# Patient Record
Sex: FEMALE | Race: WHITE
[De-identification: names, ages, dates, MRNs, and addresses within clinical notes are randomized per-mention and may not be internally consistent; named-entity substitution may affect disease eponyms.]

---

## 2019-02-12 ENCOUNTER — HOSPITAL ENCOUNTER (INPATIENT)
Dept: HOSPITAL 11 - JP.ED | Age: 84
LOS: 1 days | Discharge: HOME | DRG: 195 | End: 2019-02-13
Attending: FAMILY MEDICINE | Admitting: FAMILY MEDICINE
Payer: MEDICARE

## 2019-02-12 DIAGNOSIS — Z91.040: ICD-10-CM

## 2019-02-12 DIAGNOSIS — Z88.8: ICD-10-CM

## 2019-02-12 DIAGNOSIS — Z90.49: ICD-10-CM

## 2019-02-12 DIAGNOSIS — J40: ICD-10-CM

## 2019-02-12 DIAGNOSIS — Z91.048: ICD-10-CM

## 2019-02-12 DIAGNOSIS — R53.83: ICD-10-CM

## 2019-02-12 DIAGNOSIS — R41.0: ICD-10-CM

## 2019-02-12 DIAGNOSIS — I10: ICD-10-CM

## 2019-02-12 DIAGNOSIS — H54.7: ICD-10-CM

## 2019-02-12 DIAGNOSIS — Z85.820: ICD-10-CM

## 2019-02-12 DIAGNOSIS — J10.1: Primary | ICD-10-CM

## 2019-02-12 DIAGNOSIS — M16.12: ICD-10-CM

## 2019-02-12 DIAGNOSIS — R53.1: ICD-10-CM

## 2019-02-12 PROCEDURE — 87804 INFLUENZA ASSAY W/OPTIC: CPT

## 2019-02-12 PROCEDURE — 36415 COLL VENOUS BLD VENIPUNCTURE: CPT

## 2019-02-12 PROCEDURE — 80053 COMPREHEN METABOLIC PANEL: CPT

## 2019-02-12 PROCEDURE — 71045 X-RAY EXAM CHEST 1 VIEW: CPT

## 2019-02-12 PROCEDURE — 83605 ASSAY OF LACTIC ACID: CPT

## 2019-02-12 PROCEDURE — 85025 COMPLETE CBC W/AUTO DIFF WBC: CPT

## 2019-02-12 PROCEDURE — 81001 URINALYSIS AUTO W/SCOPE: CPT

## 2019-02-12 PROCEDURE — 96365 THER/PROPH/DIAG IV INF INIT: CPT

## 2019-02-12 PROCEDURE — 99285 EMERGENCY DEPT VISIT HI MDM: CPT

## 2019-02-12 PROCEDURE — 86140 C-REACTIVE PROTEIN: CPT

## 2019-02-12 PROCEDURE — 87040 BLOOD CULTURE FOR BACTERIA: CPT

## 2019-02-12 NOTE — HP
IDENTIFYING DATA:  Leny Conley is an 88-year-old   female from [facility].

 

CHIEF COMPLAINT:  "I feel weak."

 

HISTORY OF PRESENT ILLNESS:  An elderly female has an approximately a week long history of

an acute respiratory infection, initially thought to represent a simple viral syndrome.  She

was seen in the clinic and found to have negative influenza testing and was instructed to

treat symptomatically.  Over the last 48 hours, she has had increasing weakness,

questionable fever, malaise, and periods of confusion.  She returned to the emergency room

this evening with general lethargy and inability to care for self and was found to have

positive influenza A nasal smear.  She has had fevers and chills as well as a mildly

pleuritic cough with yellow-green sputum production.  Appetite is diminished.  No abdominal

pain, nausea, or emesis.  No nasal congestion, sore throat, or headaches.  Mild chronic

arthralgias are reported by patient.  She has not received an annual influenza vaccine.

 

PAST MEDICAL HISTORY:  Previous surgeries include cholecystectomy and screening colonoscopy

as well as  delivery in the remote past.  She has had bilateral cataract

extractions.

 

Chronic health problems include only essential hypertension with pharmacologic therapy and

degenerative arthritis of the left hip requiring ambulation with use of a walker.  She is

not currently driving.  Family checks near daily on her status, do provide assistance with

transportation, appointments, and grocery shopping.

 

ALLERGIES:  REPORTED TO ADHESIVE TAPE, DIPHENHYDRAMINE, IBUPROFEN, AND LASIX.

 

 

CURRENT MEDICATIONS:  Acetaminophen 1000 mg q.a.m. and q.6 hours p.r.n., hydrochlorothiazide

with triamterene 25/37.5 mg 1/2 tablet daily, lisinopril 1 tablet b.i.d. dose unknown, and

vitamin B12 1000 mcg p.o. daily.

 

HABITS:  Nonsmoker.  No alcohol use.  Caffeine intake of 3-4 cups of coffee daily.

 

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  She is now retired, living alone [place].  Family checks on her frequently.  A home care aide checks daily and

provides assistance with housekeeping as needed.

 

FAMILY HISTORY:  A great granddaughter has recent upper respiratory infection.  No other

familial history is of current illnesses.

 

REVIEW OF SYSTEMS:  NEUROLOGIC:  History of macular degeneration and cataract disease with

visual impairment.  No longer able to read without use of magnification.  She does not

drive.  No history of stroke, seizures, dementia, or focal weakness.

CARDIAC:  Hypertension.  No history of diabetes, ischemic heart disease, MI, chest pain,

palpitations, syncope, or unusual shortness of breath.  No dependent edema or peripheral

vascular disease.

RESPIRATORY:  Denies asthma, emphysema, tuberculosis, chronic cough or sputum production.

GI:  No history of chronic dyspepsia, hepatitis, jaundice, melena or hematochezia.

:  No chronic renal disease.  Mild urinary urgency noted today.  With her general weakness

and inability to ambulate independently, she was incontinent today.

MUSCULOSKELETAL:  Chronic left hip arthritic pain.  Some stiffness at the knees reported by

patient.  She does have a history of falls and has medical alert bracelet worn routinely.

 

PHYSICAL EXAMINATION:

GENERAL:  Appearance is that of a weak and elderly female with a harsh mildly rhonchorous

cough.

VITAL SIGNS:  Initial vitals; temperature 98.9 degrees Fahrenheit, pulse rate 85,

respiratory rate 16 with O2 sats of 96% on room air, blood pressure 101/54.

HEENT:  Status post cataract extraction.  Sclerae anicteric.  No facial asymmetry.  Speech

is clear.  Hearing slightly diminished.  Oral mucosa is moist.

NECK:  Brisk carotid pulses.  No stridor or adenopathy.

LUNGS:  Resonant, nontachypneic, symmetrical aeration.  Mild coarse expiratory rhonchi.  No

rales or wheezes heard.

HEART:  Regular without murmurs or gallops.

ABDOMEN:  Soft, nontender, and nondistended.  No organomegaly.  Active sounds.  Good femoral

pulses.  No abdominal bruits or CVA pain.

 AND RECTAL:  Omitted.

EXTREMITIES:  No pitting edema.

SKIN:  Warm and pink, intact.  Good distal arterial pulses.  Brisk capillary refill.

 

LABORATORY DATA:  On admission, WBC 3.8 with 81% segs, 9% lymphocytes, 10% monos, hemoglobin

11.2, hematocrit 35.2, platelet count 186,000.  Sodium 137, potassium 3.6, BUN 21,

creatinine 1.1, GFR 47, glucose 116.  Alkaline phosphatase 76 with AST of 21.  C-reactive

protein moderately elevated at 4.77.  Urinalysis; specific gravity 1.015, positive

ketonuria, moderate occult blood and bilirubin, negative leukocyte esterase, 0-5 wbc's, few

bacteria.  Lactic acid within normal range at 1.2.  Chest x-ray; no acute areas of

consolidation or infiltrates to suggest acute bacterial pulmonary sepsis.  Influenza nasal

smear is positive for influenza A.

 

IMPRESSIONS:

1. General weakness, fever, and lethargy secondary to influenza A.

2. History of hypertension.

3. Degenerative arthritis of left hip with moderate mobility limitations.

4. Currently at an acute vulnerable status with her infectious illness and inability to

    care for self.

 

PLAN:  The patient will be admitted to observation bed.  We will provide antipyretics,

antivirals in the form of scheduled Tamiflu, Tylenol for fever and discomfort as well as

ongoing use of antihypertensives.  Allow ambulation with assistance as tolerated and a

regular diet.  If general weakness does not allow for return to her private residence within

24 to 48 hours, we will need to consider transition to nursing home for ongoing supportive

care.  Family is in agreement.

 

 

 

 

Saleem Weathers MD

DD:  2019 22:26:39

DT:  2019 23:24:31

Job #:  [id number]

## 2019-02-12 NOTE — EDM.PDOC
<OfficerJoshua - Last Filed: 19 18:03>





ED HPI GENERAL MEDICAL PROBLEM





- General


Chief Complaint: Genitourinary Problem


Stated Complaint: ILLNESS


Time Seen by Provider: 19 17:48


Source of Information: Reports: Patient, Family, RN Notes Reviewed


History Limitations: Reports: No Limitations





- History of Present Illness


INITIAL COMMENTS - FREE TEXT/NARRATIVE: 





88-year-old female presents to emergency department today via EMS services for 

new onset confusion and weakness, she states she was seen in the clinic about a 

week ago for a cough that she's had now for 2 weeks evaluation at that time 

included rapid strep screen and rapid influenza screen both were negative. She 

has not done any treatment. She states over the last couple days she's 

developed some urinary frequency and dysuria as well as a fever





- Related Data


 Allergies











Allergy/AdvReac Type Severity Reaction Status Date / Time


 


adhesive tape Allergy  Blisters Verified 19 17:44


 


diphenhydramine HCl Allergy  Blisters Verified 19 17:44





[From Benadryl]     


 


ibuprofen [From Motrin] Allergy  Cannot Verified 19 17:44





   Remember  


 


latex Allergy  Blisters Verified 19 17:44











Home Meds: 


 Home Meds





Acetaminophen [Tylenol Extra Strength] 1,000 mg PO Q6HR 10/09/15 [History]


HCTZ/Triamterene [Maxzide 25-37.5 MG] 0.5 tab PO DAILY 10/09/15 [History]


Lisinopril 1 tab PO BID 11/21/15 [History]


Cyanocobalamin (Vitamin B-12) [Vitamin B-12] 1,000 mcg PO DAILY 19 [

History]











Past Medical History


HEENT History: Reports: Impaired Vision, Other (See Below)


Other HEENT History: Past hx of bloody nose.


Cardiovascular History: Reports: Hypertension


OB/GYN History: Reports: Pregnancy


Other OB/GYN History: Dermoid Pregnancy


Psychiatric History: Reports: Anxiety


Oncologic (Cancer) History: Reports: Other (See Below)


Other Oncologic History: melanoma





- Infectious Disease History


Infectious Disease History: Reports: Chicken Pox, Measles, Mumps, Shingles





- Past Surgical History


HEENT Surgical History: Reports: Cataract Surgery


GI Surgical History: Reports: Cholecystectomy, Colonoscopy


Female  Surgical History: Reports:  Section


Dermatological Surgical History: Reports: Skin Biopsy, Skin Graft





Social & Family History





- Tobacco Use


Smoking Status *Q: Never Smoker





- Caffeine Use


Caffeine Use: Reports: Coffee, Tea





- Recreational Drug Use


Recreational Drug Use: No





ED ROS GENERAL





- Review of Systems


Review Of Systems: See Below


Constitutional: Reports: Fever, Chills, Weakness


HEENT: Reports: No Symptoms


Respiratory: Reports: Shortness of Breath, Cough, Sputum.  Denies: Wheezing


Cardiovascular: Reports: Dyspnea on Exertion


GI/Abdominal: Reports: No Symptoms


: Reports: Dysuria, Frequency (Discharge)


Musculoskeletal: Reports: No Symptoms


Skin: Reports: No Symptoms


Neurological: Reports: No Symptoms





ED EXAM, SEPSIS





- Physical Exam


Exam: See Below


Text/Narrative:: 





General: Elderly female not in any distress orientated 2, alert HEENT: head is 

atraumatic normocephalic, eyes pupils equal round reactive to light, sclera 

clear no conjunctivitis appreciated.  Ears tympanic membranes clear and gray 

landmarks and light reflex are present bilaterally canals are clear.  Nose no 

septal deviation, nares are clear, no blood present.  Mouth mucosa is moist and 

pink no erythema or exudate noted in soft palate, tongue is midline uvula is 

midline, dentition is intact.


Neck: Supple no thyromegaly no tracheal deviation.


Nodes: Cervical nodes subclavicular nodes nontender no palpable lymphadenopathy 

noted.


Lungs: clear to auscultation bilaterally with symmetrical respirations, no 

adventitious noise appreciated.


CV: Regular rate and rhythm S1 and S2 appreciated no murmurs rubs or gallops 

noted.


Abdomen: Soft, nontender, no palpable masses or organomegaly appreciated, no 

distention no guarding bowel sounds are present, .


Neuro: Cranial nerves II through XII intact


Skin: Warm and dry, intact


Extremities: No lower extremity edema appreciated, pedal pulse is +2.





Course





- Vital Signs


Last Recorded V/S: 


 Last Vital Signs











Temp  98.9 F   19 20:


 


Pulse  85   19 20:21


 


Resp  16   19 20:21


 


BP  101/54 L  19 20:21


 


Pulse Ox  96   19 20:21














- Orders/Labs/Meds


Orders: 


 Active Orders 24 hr











 Category Date Time Status


 


 Vital Signs [RC] Q1H Care  19 17:56 Active


 


 Chest 1V Frontal [CR] Urgent Exams  19 18:21 Taken


 


 CULTURE BLOOD [BC] Urgent Lab  19 18:05 Received


 


 CULTURE BLOOD [BC] Urgent Lab  19 18:16 Received


 


 Lactated Ringers [Ringers, Lactated] 1,000 ml Med  19 18:00 Active





 IV ASDIRECTED   


 


 Blood Culture x2 Reflex Set [OM.PC] Urgent Oth  19 17:56 Ordered








 Medication Orders





Lactated Ringer's (Ringers, Lactated)  1,000 mls @ 500 mls/hr IV ASDIRECTED SUDHIR


   Last Admin: 19 18:16  Dose: 500 mls/hr








Labs: 


 Laboratory Tests











  19 Range/Units





  18:06 18:16 18:16 


 


WBC   3.8 L   (4.5-11.0)  K/uL


 


RBC   3.67   (3.30-5.50)  M/uL


 


Hgb   11.2 L   (12.0-15.0)  g/dL


 


Hct   35.2 L   (36.0-48.0)  %


 


MCV   96   (80-98)  fL


 


MCH   31   (27-31)  pg


 


MCHC   32   (32-36)  %


 


Plt Count   186   (150-400)  K/uL


 


Neut % (Auto)   81 H   (36-66)  %


 


Lymph % (Auto)   9 L   (24-44)  %


 


Mono % (Auto)   10 H   (2-6)  %


 


Eos % (Auto)   0 L   (2-4)  %


 


Baso % (Auto)   0   (0-1)  %


 


Sodium    137 L  (140-148)  mmol/L


 


Potassium    3.6  (3.6-5.2)  mmol/L


 


Chloride    98 L  (100-108)  mmol/L


 


Carbon Dioxide    29  (21-32)  mmol/L


 


Anion Gap    13.6  (5.0-14.0)  mmol/L


 


BUN    21 H  (7-18)  mg/dL


 


Creatinine    1.1 H  (0.6-1.0)  mg/dL


 


Est Cr Clr Drug Dosing    27.96  mL/min


 


Estimated GFR (MDRD)    47 L  (>60)  


 


Glucose    116 H  ()  mg/dL


 


Lactic Acid     (0.4-2.0)  mmol/L


 


Calcium    9.5  (8.5-10.1)  mg/dL


 


Total Bilirubin    0.3  (0.2-1.0)  mg/dL


 


AST    21  (15-37)  U/L


 


ALT    20  (12-78)  U/L


 


Alkaline Phosphatase    76  ()  U/L


 


C-Reactive Protein    4.77 H  (0.0-0.3)  mg/dL


 


Total Protein    7.3  (6.4-8.2)  g/dL


 


Albumin    3.6  (3.4-5.0)  g/dL


 


Globulin    3.7 H  (2.3-3.5)  g/dL


 


Albumin/Globulin Ratio    1.0 L  (1.2-2.2)  


 


Urine Color  Yellow    


 


Urine Appearance  Cloudy    


 


Urine pH  5.0    (4.5-8.0)  


 


Ur Specific Gravity  1.015    (1.008-1.030)  


 


Urine Protein  Trace    (NEGATIVE)  mg/dL


 


Urine Glucose (UA)  Normal    (NEGATIVE)  mg/dL


 


Urine Ketones  50 H    (NEGATIVE)  mg/dL


 


Urine Occult Blood  Moderate    (NEGATIVE)  


 


Urine Nitrite  Negative    (NEGATIVE)  


 


Urine Bilirubin  Moderate    (NEGATIVE)  


 


Urine Urobilinogen  4    (NORMAL)  mg/dL


 


Ur Leukocyte Esterase  Negative    (NEGATIVE)  


 


Urine RBC  0-5    (0-5)  


 


Urine WBC  0-5    (0-5)  


 


Ur Epithelial Cells  Moderate    


 


Amorphous Sediment  Few    


 


Urine Bacteria  Few    


 


Urine Mucus  Few    














  19 Range/Units





  18:16 


 


WBC   (4.5-11.0)  K/uL


 


RBC   (3.30-5.50)  M/uL


 


Hgb   (12.0-15.0)  g/dL


 


Hct   (36.0-48.0)  %


 


MCV   (80-98)  fL


 


MCH   (27-31)  pg


 


MCHC   (32-36)  %


 


Plt Count   (150-400)  K/uL


 


Neut % (Auto)   (36-66)  %


 


Lymph % (Auto)   (24-44)  %


 


Mono % (Auto)   (2-6)  %


 


Eos % (Auto)   (2-4)  %


 


Baso % (Auto)   (0-1)  %


 


Sodium   (140-148)  mmol/L


 


Potassium   (3.6-5.2)  mmol/L


 


Chloride   (100-108)  mmol/L


 


Carbon Dioxide   (21-32)  mmol/L


 


Anion Gap   (5.0-14.0)  mmol/L


 


BUN   (7-18)  mg/dL


 


Creatinine   (0.6-1.0)  mg/dL


 


Est Cr Clr Drug Dosing   mL/min


 


Estimated GFR (MDRD)   (>60)  


 


Glucose   ()  mg/dL


 


Lactic Acid  1.2  (0.4-2.0)  mmol/L


 


Calcium   (8.5-10.1)  mg/dL


 


Total Bilirubin   (0.2-1.0)  mg/dL


 


AST   (15-37)  U/L


 


ALT   (12-78)  U/L


 


Alkaline Phosphatase   ()  U/L


 


C-Reactive Protein   (0.0-0.3)  mg/dL


 


Total Protein   (6.4-8.2)  g/dL


 


Albumin   (3.4-5.0)  g/dL


 


Globulin   (2.3-3.5)  g/dL


 


Albumin/Globulin Ratio   (1.2-2.2)  


 


Urine Color   


 


Urine Appearance   


 


Urine pH   (4.5-8.0)  


 


Ur Specific Gravity   (1.008-1.030)  


 


Urine Protein   (NEGATIVE)  mg/dL


 


Urine Glucose (UA)   (NEGATIVE)  mg/dL


 


Urine Ketones   (NEGATIVE)  mg/dL


 


Urine Occult Blood   (NEGATIVE)  


 


Urine Nitrite   (NEGATIVE)  


 


Urine Bilirubin   (NEGATIVE)  


 


Urine Urobilinogen   (NORMAL)  mg/dL


 


Ur Leukocyte Esterase   (NEGATIVE)  


 


Urine RBC   (0-5)  


 


Urine WBC   (0-5)  


 


Ur Epithelial Cells   


 


Amorphous Sediment   


 


Urine Bacteria   


 


Urine Mucus   











Meds: 


Medications











Generic Name Dose Route Start Last Admin





  Trade Name Freq  PRN Reason Stop Dose Admin


 


Lactated Ringer's  1,000 mls @ 500 mls/hr  19 18:00  19 18:16





  Ringers, Lactated  IV   500 mls/hr





  ASDIRECTED SUDHIR   Administration





     





     





     





     














Discontinued Medications














Generic Name Dose Route Start Last Admin





  Trade Name Freq  PRN Reason Stop Dose Admin


 


Azithromycin  500 mg  19 18:50  19 18:56





  Zithromax  PO  19 18:51  500 mg





  ONETIME ONE   Administration





     





     





     





     


 


Ceftriaxone Sodium 1 gm/  50 mls @ 100 mls/hr  19 18:11  19 18:16





  Sodium Chloride  IV  19 18:40  100 mls/hr





  ONETIME ONE   Administration





     





     





     





     


 


Oseltamivir Phosphate  75 mg  19 19:05  19 19:32





  Tamiflu  PO  19 19:06  75 mg





  ONETIME ONE   Administration





     





     





     





     














Departure





- Departure


Disposition: Home, Self-Care 01


Clinical Impression: 


 Influenza A, Bronchitis








- Discharge Information





<Zeferino Meredith - Last Filed: 19 21:56>





Course





- Re-Assessments/Exams


Free Text/Narrative Re-Assessment/Exam: 





19 18:55


Patient care turned over from  Officer pending lab and x-ray. White count 

was 3800, UA was negative for infection and chest x-ray showed no significant 

infiltrate. Her influenza A is now positive, it was negative in the clinic last 

week. She is not hypoxic and is otherwise stable. She does not want 

hospitalization. She'll be discharged on a 5 day course of Tamiflu and 

Zithromax to cover atypicals, she has an appointment with her primary provider 

tomorrow. If she worsens, especially difficulty breathing she should return to 

the emergency room.


19 21:55


Patient attempted to leave but she is really too weak to even stand. The family 

was very concerned about her ability to care for herself. Patient was willing 

to be admitted, Dr. Weathers was contacted for admission.





Departure





- Departure


Time of Disposition: 20:13


Condition: Fair

## 2019-02-13 VITALS — DIASTOLIC BLOOD PRESSURE: 54 MMHG | SYSTOLIC BLOOD PRESSURE: 107 MMHG

## 2019-02-13 NOTE — PN
DATE OF SERVICE:  02/13/2019

 

SUBJECTIVE:  An 88-year-old female was admitted yesterday evening with generalized weakness,

fever, congestion, cough, and positive influenza A studies.  Through the night, she has

rested intermittently.  She has an occasional harsh mildly productive cough.  Denies pain.

No nausea or emesis.  She is taking small amounts of liquid.  Denies shortness of breath.

 

OBJECTIVE:  VITAL SIGNS:  Temperature this morning 38.1 degrees centigrade, pulse rate 74,

blood pressure 89/44, respiratory rate 19 with O2 saturations 94% on room air.

HEENT:  Throat is moist and pink.

NECK:  Brisk carotid pulses.  No stridor or adenopathy.

LUNGS:  Occasional mild rhonchorous cough, non-tachypneic.  No wheezes or rales heard.

HEART:  Regular without murmurs or gallops.

EXTREMITIES:  Warm and pink with good turgor.

 

IMPRESSION AND PLAN:  Influenza A with accompanying weakness on presentation.  Has not yet

ambulated out of bed.  We will encourage movement about the room with supervision of nursing

staff today.  Provide dietary intake as tolerated.  Encouraged increased fluid intake and

maintain on antiviral therapies with Tamiflu.  If she shows sufficient improvement, to allow

for performance of ADLs in a reliable manner without weakness or confusion.  We will

consider discharge to home today.  Family and caregivers do check daily on her status and

likely will be able to be discharged to home without additional home care services.  We will

follow up at midday to review her current functional status.

 

 

 

 

Saleem Weathers MD

DD:  02/13/2019 05:05:39

DT:  02/13/2019 05:19:31

Job #:  [id number]

## 2019-02-14 NOTE — CRLCR
Final Report: 

INDICATION: cough, fever



TECHNIQUE:

Chest 1 view. 



COMPARISON:

None. 



FINDINGS:

Cardiovascular and mediastinum: Heart size and vasculature are normal in 
caliber and appearance. Mediastinum is within normal limits. 



Lungs and pleural space: Lungs are clear. No sign of infiltrate or mass. No 
sign of pleural effusion. No pneumothorax. 



Bones and soft tissues: No significant findings. 



IMPRESSION:

Unremarkable chest.





Dictated by: Alec Roque MD @ 02/12/2019 18:46:48

Signed by: Alec Roque MD @2/12/2019 6:46:48 PM

(Electronic Signature)
MTDD

## 2019-02-14 NOTE — DISCH
REASON FOR ADMISSION:  An 88-year-old  female who lived alone, was admitted with

complaints of fever, general weakness, and reported confusion identified by family.  She had

a 2-day history of acute respiratory symptoms with congestion and cough.  Family reports an

approximate week long history of lesser congestion and cough with earlier influenza studies

obtained at the clinic noted to be normal or negative.

 

She has noted history of essential hypertension.  Additionally, she has degenerative

arthritis of the left hip.  She uses a walker as an ambulatory aid.  Family and

acquaintances check routinely.  She is provided transportation by family.  She no longer

drives.

 

PHYSICAL EXAMINATION ON ADMISSION:  VITAL SIGNS:  Temperature 98.9 degrees, pulse rate 85,

respiratory rate 16 with O2 sats 96% on room air, blood pressure 101/54.

HEENT:  Status post cataract extraction.  No facial asymmetry.  Speech is clear.  Oral

mucosa was moist.

NECK:  Brisk carotid pulses.  No stridor or adenopathy.

LUNGS:  Symmetrical aeration.  Non-tachypneic.  Mild coarse expiratory rhonchi.  No wheezes

or rales heard.

HEART:  Regular without murmurs.

ABDOMEN:  Benign.

EXTREMITIES:  No pitting edema.

SKIN:  Warm, pink, and intact with brisk arterial pulses.

 

LABORATORY DATA:  On admission:  WBC 3.8 with 81% segs, 9% lymphocytes, 10 monos; hemoglobin

11.2; platelet count 186,000.  Sodium 137, potassium 3.6, BUN 21, creatinine 1.1, GFR 47,

glucose 116, alkaline phosphatase 76, AST 21.  CRP mildly elevated at 4.77.  Urinalysis:

Specific gravity of 1.015, positive ketonuria, moderate occult blood and bilirubin, negative

leukocyte esterase, 0 to  5 wbc's, few bacteria.  Lactic acid 1.2.

 

Chest x-ray:  No acute consolidation or infiltrates.

 

Influenza nasal smear was positive for influenza A.

 

HOSPITAL COURSE:  The patient was admitted to observation status with noted generalized

weakness and reported confusion of 1 to 2 days duration.  She had congestion, cough, and

scant sputum production.  She rested comfortably through the night and had no acute

respiratory distress.  O2 sats were maintained within normal range on room air.

 

The following morning cough was showing interval improvement with antiviral therapy.

Appetite was improved.  She was ambulated with standby assistance of staff.  She was able to

rise from toilet and bed unassisted, ambulate short distances limited primarily by left hip

pain of recognized chronic arthritis as well as accompanying generalized weakness.  Physical

therapy services were obtained.  She was found to be able to reliably perform ambulatory

activities with reasonable symmetrical strength.  Plans for discharge to home were made.

 

CONDITION:  Improved.

 

DISCHARGE INSTRUCTIONS:

1. Home with family on 02/13/2019.

2. Diet:  As tolerated.

3. No driving.  May be up with use of walker as tolerated.

4. We will hold antihypertensive therapies.  Continue with antiviral therapies in the form

    of Tamiflu at 35 mg p.o. daily x3 days to complete a 5-day course of therapy.

5. May resume p.r.n. use of Tylenol for arthritic hip.

 

ADMITTING DIAGNOSES:

1. Influenza A with fever, weakness, and lethargy.

2. History of hypertension.

3. Degenerative arthritis of left hip.

 

DISCHARGE DIAGNOSES:

1. Influenza A, weakness, fever, lethargy, and transient confusion, now showing interval

    improvement.

2. Hypertension.

3. Degenerative arthritis of left hip with moderate mobility limitations, requiring use of

    walker.

## 2019-06-05 ENCOUNTER — HOSPITAL ENCOUNTER (INPATIENT)
Dept: HOSPITAL 11 - JP.ED | Age: 84
LOS: 5 days | Discharge: NURSING, REHAB OR LONG-TERM CARE | DRG: 690 | End: 2019-06-10
Attending: INTERNAL MEDICINE | Admitting: INTERNAL MEDICINE
Payer: MEDICARE

## 2019-06-05 DIAGNOSIS — Z90.49: ICD-10-CM

## 2019-06-05 DIAGNOSIS — R11.0: ICD-10-CM

## 2019-06-05 DIAGNOSIS — Z98.891: ICD-10-CM

## 2019-06-05 DIAGNOSIS — G89.29: ICD-10-CM

## 2019-06-05 DIAGNOSIS — R53.1: ICD-10-CM

## 2019-06-05 DIAGNOSIS — Z85.820: ICD-10-CM

## 2019-06-05 DIAGNOSIS — R10.32: ICD-10-CM

## 2019-06-05 DIAGNOSIS — I48.0: ICD-10-CM

## 2019-06-05 DIAGNOSIS — Z88.8: ICD-10-CM

## 2019-06-05 DIAGNOSIS — F41.9: ICD-10-CM

## 2019-06-05 DIAGNOSIS — Z91.040: ICD-10-CM

## 2019-06-05 DIAGNOSIS — Z79.899: ICD-10-CM

## 2019-06-05 DIAGNOSIS — I10: ICD-10-CM

## 2019-06-05 DIAGNOSIS — Z66: ICD-10-CM

## 2019-06-05 DIAGNOSIS — R21: ICD-10-CM

## 2019-06-05 DIAGNOSIS — H54.7: ICD-10-CM

## 2019-06-05 DIAGNOSIS — M19.90: ICD-10-CM

## 2019-06-05 DIAGNOSIS — N30.00: Primary | ICD-10-CM

## 2019-06-05 DIAGNOSIS — E86.0: ICD-10-CM

## 2019-06-05 DIAGNOSIS — R50.9: ICD-10-CM

## 2019-06-05 DIAGNOSIS — R41.0: ICD-10-CM

## 2019-06-05 DIAGNOSIS — R53.81: ICD-10-CM

## 2019-06-05 DIAGNOSIS — R29.810: ICD-10-CM

## 2019-06-05 PROCEDURE — 84145 PROCALCITONIN (PCT): CPT

## 2019-06-05 PROCEDURE — 96361 HYDRATE IV INFUSION ADD-ON: CPT

## 2019-06-05 PROCEDURE — 96360 HYDRATION IV INFUSION INIT: CPT

## 2019-06-05 PROCEDURE — 99285 EMERGENCY DEPT VISIT HI MDM: CPT

## 2019-06-05 PROCEDURE — 80053 COMPREHEN METABOLIC PANEL: CPT

## 2019-06-05 PROCEDURE — 36415 COLL VENOUS BLD VENIPUNCTURE: CPT

## 2019-06-05 PROCEDURE — 87086 URINE CULTURE/COLONY COUNT: CPT

## 2019-06-05 PROCEDURE — 74177 CT ABD & PELVIS W/CONTRAST: CPT

## 2019-06-05 PROCEDURE — 85025 COMPLETE CBC W/AUTO DIFF WBC: CPT

## 2019-06-05 PROCEDURE — 81001 URINALYSIS AUTO W/SCOPE: CPT

## 2019-06-05 PROCEDURE — 87040 BLOOD CULTURE FOR BACTERIA: CPT

## 2019-06-05 PROCEDURE — 83605 ASSAY OF LACTIC ACID: CPT

## 2019-06-05 NOTE — EDM.PDOC
ED HPI GENERAL MEDICAL PROBLEM





- General


Chief Complaint: Fever


Stated Complaint: FEVER,WEAKNESS


Time Seen by Provider: 19 21:40


Source of Information: Reports: Patient


History Limitations: Reports: No Limitations





- History of Present Illness


INITIAL COMMENTS - FREE TEXT/NARRATIVE: 





80-year-old female who still lives independently has been feeling weak and ill 

for the past couple of days, has developed some lower abdominal pain and 

intermittent fever. Tonight her family went to evaluate her and she was too 

weak to walk, she had a temperature of "102.8" and they felt she wasn't eating. 

Other than some vague left lower quadrant abdominal pain, she has no specific 

complaints such as cold symptoms, cough, shortness of breath, headache, joint 

pains or rigors. She denies any urinary symptoms. No diarrhea.


Onset: Gradual


Duration: Day(s): (3 days)


Location: Reports: Abdomen (Left lower quadrant)


Associated Symptoms: Reports: Fever/Chills, Malaise, Weakness.  Denies: Cough, 

Nausea/Vomiting, Shortness of Breath


  ** Hip


Pain Score (Numeric/FACES): 3





- Related Data


 Allergies











Allergy/AdvReac Type Severity Reaction Status Date / Time


 


adhesive tape Allergy  Blisters Verified 19 21:21


 


diphenhydramine HCl Allergy  Blisters Verified 19 21:21





[From Benadryl]     


 


ibuprofen [From Motrin] Allergy  Cannot Verified 19 21:21





   Remember  


 


latex Allergy  Blisters Verified 19 21:21











Home Meds: 


 Home Meds





Acetaminophen [Tylenol] 650 mg PO Q4H PRN  tablet 19 [Rx]


Fluorouracil 1 applic TOP ASDIRECTED 19 [History]


Triamterene/Hydrochlorothiazid [Triamterene-HCTZ 37.5-25 MG] 0.5 each PO DAILY 

19 [History]











Past Medical History


HEENT History: Reports: Impaired Vision, Other (See Below)


Other HEENT History: Past hx of bloody nose.


Cardiovascular History: Reports: Hypertension


OB/GYN History: Reports: Pregnancy


Other OB/GYN History: Dermoid Pregnancy


Psychiatric History: Reports: Anxiety


Oncologic (Cancer) History: Reports: Other (See Below)


Other Oncologic History: melanoma





- Infectious Disease History


Infectious Disease History: Reports: Chicken Pox





- Past Surgical History


HEENT Surgical History: Reports: Cataract Surgery


GI Surgical History: Reports: Cholecystectomy, Colonoscopy


Female  Surgical History: Reports:  Section


Dermatological Surgical History: Reports: Skin Biopsy, Skin Graft





Social & Family History





- Family History


Family Medical History: Unobtainable





- Tobacco Use


Smoking Status *Q: Never Smoker


Second Hand Smoke Exposure: No





- Caffeine Use


Caffeine Use: Reports: Coffee





- Recreational Drug Use


Recreational Drug Use: No





ED ROS GENERAL





- Review of Systems


Review Of Systems: See Below


Constitutional: Reports: Fever, Chills, Malaise, Weakness, Decreased Appetite


HEENT: Reports: No Symptoms


Respiratory: Denies: Shortness of Breath, Cough


Cardiovascular: Denies: Chest Pain


GI/Abdominal: Reports: Abdominal Pain, Nausea (When eating).  Denies: 

Constipation, Diarrhea, Vomiting


: Reports: No Symptoms


Skin: Reports: Other (Significant systemic rash that she is applying any anti-

neoplastic cream)


Neurological: Reports: Weakness.  Denies: Confusion, Dizziness, Headache





ED EXAM, SEPSIS





- Physical Exam


Exam: See Below


Exam Limited By: No Limitations


General Appearance: Alert, No Apparent Distress, Other (Appears weak but not in 

distress)


Eye Exam: Bilateral Eye: EOMI (No jaundice, good hydration)


Head: Atraumatic


Respiratory/Chest: No Respiratory Distress, Lungs Clear


Cardiovascular: Regular Rate, Rhythm.  No: Tachycardia


GI/Abdominal Exam: Soft, Tender (Reacts with tenderness with slight guarding in 

the left lower quadrant, no significant rebound tenderness)


Extremities: Normal Inspection.  No: Pedal Edema


Neurological: Alert, Oriented


Psychiatric: Depressed Mood, Flat Affect


Skin: Warm, Dry, Other (Diffuse erythematous macular rash)





Course





- Vital Signs


Last Recorded V/S: 


 Last Vital Signs











Temp  98.8 F   19 04:00


 


Pulse  109 H  19 16:00


 


Resp  20   19 16:00


 


BP  99/59 L  19 16:00


 


Pulse Ox  96   19 16:00














- Orders/Labs/Meds


Orders: 


 Medication Orders





Acetaminophen (Tylenol)  650 mg PO Q4H PRN


   PRN Reason: Pain


   Last Admin: 19 09:37  Dose: 650 mg


   Admin: 19 17:04  Dose: 650 mg


   Admin: 19 09:01  Dose: 325 mg


   Admin: 19 08:48  Dose: 325 mg


Albuterol (Proventil Neb Soln)  2.5 mg NEB Q4H PRN


   PRN Reason: Shortness Of Breath/wheezing


Amiodarone HCl (Cordarone)  200 mg PO BID SUDHIR


Bisacodyl (Dulcolax)  5 mg PO DAILY PRN


   PRN Reason: Constipation


Cefdinir (Omnicef)  300 mg PO BID SUDHIR


Docusate Sodium (Colace)  100 mg PO BID PRN


   PRN Reason: Constipation


Ondansetron HCl (Zofran Odt)  4 mg PO Q6H PRN


   PRN Reason: Nausea able to take PO


Ondansetron HCl (Zofran)  4 mg IV Q4H PRN


   PRN Reason: Nausea/Vomiting


Pantoprazole Sodium (Protonix***)  40 mg PO ACBREAKFAST SUDHIR


   Last Admin: 19 07:44  Dose: 40 mg


   Admin: 19 08:03  Dose: 40 mg


   Admin: 19 08:49  Dose: 40 mg








Labs: 


 Laboratory Tests











  19 Range/Units





  21:44 21:56 21:56 


 


WBC   3.2 L   (4.5-11.0)  K/uL


 


RBC   3.34   (3.30-5.50)  M/uL


 


Hgb   10.4 L   (12.0-15.0)  g/dL


 


Hct   31.2 L   (36.0-48.0)  %


 


MCV   93   (80-98)  fL


 


MCH   31   (27-31)  pg


 


MCHC   33   (32-36)  %


 


Plt Count   79 L   (150-400)  K/uL


 


Neut % (Auto)   39   (36-66)  %


 


Lymph % (Auto)   44   (24-44)  %


 


Mono % (Auto)   13 H   (2-6)  %


 


Eos % (Auto)   0 L   (2-4)  %


 


Baso % (Auto)   4 H   (0-1)  %


 


Sodium    134 L  (140-148)  mmol/L


 


Potassium    3.7  (3.6-5.2)  mmol/L


 


Chloride    101  (100-108)  mmol/L


 


Carbon Dioxide    27  (21-32)  mmol/L


 


Anion Gap    9.7  (5.0-14.0)  mmol/L


 


BUN    21 H  (7-18)  mg/dL


 


Creatinine    0.9  (0.6-1.0)  mg/dL


 


Est Cr Clr Drug Dosing    37.31  mL/min


 


Estimated GFR (MDRD)    59 L  (>60)  


 


Glucose    113 H  ()  mg/dL


 


Lactic Acid     (0.4-2.0)  mmol/L


 


Calcium    8.9  (8.5-10.1)  mg/dL


 


Total Bilirubin    0.6  D  (0.2-1.0)  mg/dL


 


AST    53 H D  (15-37)  U/L


 


ALT    44  D  (12-78)  U/L


 


Alkaline Phosphatase    71  ()  U/L


 


Total Protein    5.9 L  (6.4-8.2)  g/dL


 


Albumin    3.1 L  (3.4-5.0)  g/dL


 


Globulin    2.8  (2.3-3.5)  g/dL


 


Albumin/Globulin Ratio    1.1 L  (1.2-2.2)  


 


Procalcitonin    0.92  ng/mL


 


Urine Color  Orange    


 


Urine Appearance  Clear    


 


Urine pH  6.0    (4.5-8.0)  


 


Ur Specific Gravity  1.015    (1.008-1.030)  


 


Urine Protein  30 H    (NEGATIVE)  mg/dL


 


Urine Glucose (UA)  Normal    (NEGATIVE)  mg/dL


 


Urine Ketones  Negative    (NEGATIVE)  mg/dL


 


Urine Occult Blood  Moderate    (NEGATIVE)  


 


Urine Nitrite  Negative    (NEGATIVE)  


 


Urine Bilirubin  Small    (NEGATIVE)  


 


Urine Urobilinogen  8    (NORMAL)  mg/dL


 


Ur Leukocyte Esterase  Small    (NEGATIVE)  


 


Urine RBC  5-10 H    (0-5)  


 


Urine WBC  5-10 H    (0-5)  


 


Ur Epithelial Cells  Moderate    


 


Amorphous Sediment  Few    


 


Urine Bacteria  Few    


 


Urine Mucus  Moderate    


 


Urine Other  See note    














  19 Range/Units





  21:56 


 


WBC   (4.5-11.0)  K/uL


 


RBC   (3.30-5.50)  M/uL


 


Hgb   (12.0-15.0)  g/dL


 


Hct   (36.0-48.0)  %


 


MCV   (80-98)  fL


 


MCH   (27-31)  pg


 


MCHC   (32-36)  %


 


Plt Count   (150-400)  K/uL


 


Neut % (Auto)   (36-66)  %


 


Lymph % (Auto)   (24-44)  %


 


Mono % (Auto)   (2-6)  %


 


Eos % (Auto)   (2-4)  %


 


Baso % (Auto)   (0-1)  %


 


Sodium   (140-148)  mmol/L


 


Potassium   (3.6-5.2)  mmol/L


 


Chloride   (100-108)  mmol/L


 


Carbon Dioxide   (21-32)  mmol/L


 


Anion Gap   (5.0-14.0)  mmol/L


 


BUN   (7-18)  mg/dL


 


Creatinine   (0.6-1.0)  mg/dL


 


Est Cr Clr Drug Dosing   mL/min


 


Estimated GFR (MDRD)   (>60)  


 


Glucose   ()  mg/dL


 


Lactic Acid  1.0  (0.4-2.0)  mmol/L


 


Calcium   (8.5-10.1)  mg/dL


 


Total Bilirubin   (0.2-1.0)  mg/dL


 


AST   (15-37)  U/L


 


ALT   (12-78)  U/L


 


Alkaline Phosphatase   ()  U/L


 


Total Protein   (6.4-8.2)  g/dL


 


Albumin   (3.4-5.0)  g/dL


 


Globulin   (2.3-3.5)  g/dL


 


Albumin/Globulin Ratio   (1.2-2.2)  


 


Procalcitonin   ng/mL


 


Urine Color   


 


Urine Appearance   


 


Urine pH   (4.5-8.0)  


 


Ur Specific Gravity   (1.008-1.030)  


 


Urine Protein   (NEGATIVE)  mg/dL


 


Urine Glucose (UA)   (NEGATIVE)  mg/dL


 


Urine Ketones   (NEGATIVE)  mg/dL


 


Urine Occult Blood   (NEGATIVE)  


 


Urine Nitrite   (NEGATIVE)  


 


Urine Bilirubin   (NEGATIVE)  


 


Urine Urobilinogen   (NORMAL)  mg/dL


 


Ur Leukocyte Esterase   (NEGATIVE)  


 


Urine RBC   (0-5)  


 


Urine WBC   (0-5)  


 


Ur Epithelial Cells   


 


Amorphous Sediment   


 


Urine Bacteria   


 


Urine Mucus   


 


Urine Other   











Meds: 


Medications











Generic Name Dose Route Start Last Admin





  [facility]  PRN Reason Stop Dose Admin


 


Acetaminophen  650 mg  19 02:16  19 09:37





  Tylenol  PO   650 mg





  Q4H PRN   Administration





  Pain   





     





     





     


 


Albuterol  2.5 mg  19 02:16  





  Proventil Neb Soln  NEB   





  Q4H PRN   





  Shortness Of Breath/wheezing   





     





     





     


 


Amiodarone HCl  200 mg  19 21:00  





  Cordarone  PO   





  BID SUDHIR   





     





     





     





     


 


Bisacodyl  5 mg  19 02:16  





  Dulcolax  PO   





  DAILY PRN   





  Constipation   





     





     





     


 


Cefdinir  300 mg  19 21:00  





  Omnicef  PO   





  BID SUDHIR   





     





     





     





     


 


Docusate Sodium  100 mg  19 02:16  





  Colace  PO   





  BID PRN   





  Constipation   





     





     





     


 


Ondansetron HCl  4 mg  19 02:16  





  Zofran Odt  PO   





  Q6H PRN   





  Nausea able to take PO   





     





     





     


 


Ondansetron HCl  4 mg  19 02:16  





  Zofran  IV   





  Q4H PRN   





  Nausea/Vomiting   





     





     





     


 


Pantoprazole Sodium  40 mg  19 07:30  19 07:44





  Protonix***  PO   40 mg





  ACBREAKFAST SUDHIR   Administration





     





     





     





     














Discontinued Medications














Generic Name Dose Route Start Last Admin





  Trade Name Freq  PRN Reason Stop Dose Admin


 


Amiodarone HCl  200 mg  19 10:45  19 08:20





  Cordarone  PO   200 mg





  DAILY SUDIHR   Administration





     





     





     





     


 


Sodium Chloride  1,000 mls @ 500 mls/hr  19 22:15  19 22:28





  Normal Saline  IV   500 mls/hr





  ASDIRECTED SUDHIR   Administration





     





     





     





     


 


Sodium Chloride  70 mls @ 3 mls/sec  19 23:19  19 23:37





  Normal Saline  IV  19 23:20  3 mls/sec





  ASDIRECTED STA   Administration





     





     





     





     


 


Ceftriaxone Sodium 1 gm/  50 mls @ 100 mls/hr  19 02:00  19 02:43





  Sodium Chloride  IV   100 mls/hr





  Q24H SUDHIR   Administration





     





     





     





     


 


Sodium Chloride  1,000 mls @ 100 mls/hr  19 02:16  19 10:20





  Normal Saline  IV   100 mls/hr





  ASDIRECTED SUDHIR   Administration





     





     





     





     


 


Amiodarone HCl 450 mg/  250 mls @ 33.33 mls/hr  19 11:00  19 11:25





  Dextrose/Water  IV   1 mg/min





  ASDIRECTED SUDHIR   33.33 mls/hr





     Administration





     





  Protocol   





  1 MG/MIN   


 


Amiodarone HCl/Dextrose 150 mg  100 mls @ 400 mls/hr  19 11:00  19 

11:07





  / Premix  IV  19 11:14  400 mls/hr





  ONETIME ONE   Administration





     





     





     





     


 


Sodium Chloride  1,000 mls @ 25 mls/hr  19 12:45  





  Normal Saline  IV   





  ASDIRECTED SUDHIR   





     





     





     





     


 


Magnesium Sulfate 2 gm/ Premix  50 mls @ 12.5 mls/hr  19 13:00  19 

12:59





  IV  19 16:59  12.5 mls/hr





  ONETIME ONE   Administration





     





     





     





     


 


Amiodarone HCl/Dextrose 150 mg  100 mls @ 400 mls/hr  19 10:15  19 

10:10





  / Premix  IV  19 10:29  400 mls/hr





  NOW ONE   Administration





     





     





     





     


 


Iopamidol  100 ml  19 23:19  19 23:37





  Isovue-300 (61%)  IV  19 23:20  100 ml





  .AS DIRECTED STA   Administration





     





     





     





     


 


Metoprolol Tartrate  25 mg  19 01:40  19 02:43





  Lopressor  PO  19 01:41  25 mg





  ONETIME ONE   Administration





     





     





     





     


 


Triamterene/HCTZ  0.5 each  19 09:00  19 08:49





  Maxzide 25-37.5 Mg  PO   0.5 each





  DAILY SUDHIR   Administration





     





     





     





     














- Re-Assessments/Exams


Free Text/Narrative Re-Assessment/Exam: 





19 22:01


A quick catheter in and out UA was obtained along with a CBC, CMP, blood 

cultures and pro-calcitonin.


19 00:23


UA did not show any significant inflammatory findings. White count was 3200 but 

she has chronic low white cell levels. CMP revealed mild dehydration, pro-

calcitonin and lactic acid were both negative and she did not reestablish a 

fever while in the emergency room. She did not however regain her strength and 

was still barely unable to bear weight so we'll need to be admitted for the 

next day or 2 for hydration and strengthening.





Departure





- Departure


Time of Disposition: 02:14


Disposition: Admitted As Inpatient 66


Clinical Impression: 


 Weakness, Dehydration








- Discharge Information

## 2019-06-06 NOTE — PCM.HP
H&P History of Present Illness





- General


Date of Service: 19


Admit Problem/Dx: 


 Admission Diagnosis/Problem





Admission Diagnosis/Problem      Weakness








Source of Information: Patient, Family (Son and Daughter-in-law)


History Limitations: Reports: No Limitations





- History of Present Illness


Initial Comments - Free Text/Narative: 





80-year-old female who still lives independently has been feeling weak and ill 

for the past couple of days, has developed some lower abdominal pain and 

intermittent fever. Tonight her family went to evaluate her and she was too 

weak to walk, she had a temperature of "102.8" and they felt she wasn't eating. 

Other than some vague left lower quadrant abdominal pain, she has no specific 

complaints such as cold symptoms, cough, shortness of breath, headache, joint 

pains or rigors. She denies any urinary symptoms. No diarrhea.


Onset: Gradual


Duration: Day(s): (3 days)


Location: Reports: Abdomen (Left lower quadrant)


Associated Symptoms: Reports: Fever/Chills, Malaise, Weakness.  Denies: Cough, 

Nausea/Vomiting, Shortness of Breath








Onset of Symptoms: Reports: Sudden, Gradual (3 to 4 days of gradual illness.)


Location: Reports: Generalized


Improves with: Reports: None


Worsens with: Reports: None


Associated Symptoms: Reports: Nausea/Vomiting (nausea without vomiting)





- Related Data


Allergies/Adverse Reactions: 


 Allergies











Allergy/AdvReac Type Severity Reaction Status Date / Time


 


adhesive tape Allergy  Blisters Verified 19 21:21


 


diphenhydramine HCl Allergy  Blisters Verified 19 21:21





[From Benadryl]     


 


ibuprofen [From Motrin] Allergy  Cannot Verified 19 21:21





   Remember  


 


latex Allergy  Blisters Verified 19 21:21











Home Medications: 


 Home Meds





Acetaminophen [Tylenol] 650 mg PO Q4H PRN  tablet 19 [Rx]


Fluorouracil 1 applic TOP ASDIRECTED 19 [History]


Triamterene/Hydrochlorothiazid [Triamterene-HCTZ 37.5-25 MG] 0.5 each PO DAILY 

19 [History]











Past Medical History


HEENT History: Reports: Impaired Vision, Other (See Below)


Other HEENT History: Past hx of bloody nose.


Cardiovascular History: Reports: Hypertension


OB/GYN History: Reports: Pregnancy


Other OB/BYN History: Dermoid Pregnancy


Psychiatric History: Reports: Anxiety


Oncologic (Cancer) History: Reports: Other (See Below)


Other Oncologic History: melanoma





- Infectious Disease History


Infectious Disease History: Reports: Chicken Pox





- Past Surgical History


HEENT Surgical History: Reports: Cataract Surgery


GI Surgical History: Reports: Cholecystectomy, Colonoscopy


Female  Surgical History: Reports:  Section


Dermatological Surgical History: Reports: Skin Biopsy, Skin Graft





Social & Family History





- Family History


Family Medical History: Unobtainable





- Tobacco Use


Smoking Status *Q: Never Smoker


Second Hand Smoke Exposure: No





- Caffeine Use


Caffeine Use: Reports: Coffee





- Recreational Drug Use


Recreational Drug Use: No





- Living Situation & Occupation


Living situation: Reports:  (  22 years ago, lives alone in 

[place]. worked until age 85 years, when told she couldn't drive anymore. 

She reports she is the last one alive of her 10 brothers and sister. She had 2 

Sons, one  of cancer, one Grandson . Lives alone in her own home.)





H&P Review of Systems





- Review of Systems:


Review Of Systems: See Below


General: Reports: Fever (102.8 at home)


HEENT: Reports: Glasses, Other (natural teeth)


Pulmonary: Reports: No Symptoms


Cardiovascular: Reports: No Symptoms


Gastrointestinal: Reports: No Symptoms


Genitourinary: Reports: No Symptoms


Musculoskeletal: Reports: Joint Pain (chronic left hip pain from arthritis)


Skin: Reports: Other (history of skin cancer to face and left side of jaw and 

ear, left shoulder. currently being treated by Dermatology.)


Psychiatric: Reports: No Symptoms


Neurological: Reports: No Symptoms


Hematologic/Lymphatic: Reports: No Symptoms


Immunologic: Reports: No Symptoms





Exam





- Exam


Exam: See Below





- Vital Signs


Vital Signs: 


 Last Vital Signs











Temp  37.1 C   19 22:45


 


Pulse  102 H  19 22:45


 


Resp  12   19 21:25


 


BP  107/58 L  19 22:45


 


Pulse Ox  96   19 22:45











Weight: 67.585 kg





- Exam


Quality Assessment: DVT Prophylaxis


General: Alert, Oriented, Cooperative


HEENT: PERRLA, Conjunctiva Clear, EACs Clear, EOMI, Hearing Intact, Mucosa 

Moist & Pink, Nares Patent, Normal Nasal Septum, Posterior Pharynx Clear, 

Pupils Equal, Pupils Reactive, TMs Clear, Other (natural teeth noted. )


Neck: Supple, Trachea Midline


Lungs: Clear to Auscultation, Normal Respiratory Effort


Cardiovascular: Regular Rate, Normal S1, Normal S2, Irregular Rhythm


GI/Abdominal Exam: Normal Bowel Sounds, Soft, Non-Tender, No Distention, No 

Abnormal Bruit, No Mass, Pelvis Stable


 (Female) Exam: Deferred


Rectal (Female) Exam: Deferred


Back Exam: Normal Inspection


Extremities: Normal Inspection, Normal Range of Motion, Pedal Edema (bilateral.)


Skin: Warm, Dry, Intact, Rash (yeast rash noted to left abdominal groin fold)


Neurological: Reflexes Equal Bilateral, Strength Equal Bilateral


Neuro Extensive - Mental Status: Alert, Oriented x3, Normal Mood/Affect, Normal 

Cognition, Memory Intact


Neuro Extensive - Motor, Sensory, Reflexes: Other (weakness)


Psychiatric: Alert, Normal Affect, Normal Mood





- Patient Data


Lab Results Last 24 hrs: 


 Laboratory Results - last 24 hr











  19 Range/Units





  21:44 21:56 21:56 


 


WBC   3.2 L   (4.5-11.0)  K/uL


 


RBC   3.34   (3.30-5.50)  M/uL


 


Hgb   10.4 L   (12.0-15.0)  g/dL


 


Hct   31.2 L   (36.0-48.0)  %


 


MCV   93   (80-98)  fL


 


MCH   31   (27-31)  pg


 


MCHC   33   (32-36)  %


 


Plt Count   79 L   (150-400)  K/uL


 


Neut % (Auto)   39   (36-66)  %


 


Lymph % (Auto)   44   (24-44)  %


 


Mono % (Auto)   13 H   (2-6)  %


 


Eos % (Auto)   0 L   (2-4)  %


 


Baso % (Auto)   4 H   (0-1)  %


 


Sodium    134 L  (140-148)  mmol/L


 


Potassium    3.7  (3.6-5.2)  mmol/L


 


Chloride    101  (100-108)  mmol/L


 


Carbon Dioxide    27  (21-32)  mmol/L


 


Anion Gap    9.7  (5.0-14.0)  mmol/L


 


BUN    21 H  (7-18)  mg/dL


 


Creatinine    0.9  (0.6-1.0)  mg/dL


 


Est Cr Clr Drug Dosing    37.31  mL/min


 


Estimated GFR (MDRD)    59 L  (>60)  


 


Glucose    113 H  ()  mg/dL


 


Lactic Acid     (0.4-2.0)  mmol/L


 


Calcium    8.9  (8.5-10.1)  mg/dL


 


Total Bilirubin    0.6  D  (0.2-1.0)  mg/dL


 


AST    53 H D  (15-37)  U/L


 


ALT    44  D  (12-78)  U/L


 


Alkaline Phosphatase    71  ()  U/L


 


Total Protein    5.9 L  (6.4-8.2)  g/dL


 


Albumin    3.1 L  (3.4-5.0)  g/dL


 


Globulin    2.8  (2.3-3.5)  g/dL


 


Albumin/Globulin Ratio    1.1 L  (1.2-2.2)  


 


Procalcitonin    0.92  ng/mL


 


Urine Color  Orange    


 


Urine Appearance  Clear    


 


Urine pH  6.0    (4.5-8.0)  


 


Ur Specific Gravity  1.015    (1.008-1.030)  


 


Urine Protein  30 H    (NEGATIVE)  mg/dL


 


Urine Glucose (UA)  Normal    (NEGATIVE)  mg/dL


 


Urine Ketones  Negative    (NEGATIVE)  mg/dL


 


Urine Occult Blood  Moderate    (NEGATIVE)  


 


Urine Nitrite  Negative    (NEGATIVE)  


 


Urine Bilirubin  Small    (NEGATIVE)  


 


Urine Urobilinogen  8    (NORMAL)  mg/dL


 


Ur Leukocyte Esterase  Small    (NEGATIVE)  


 


Urine RBC  5-10 H    (0-5)  


 


Urine WBC  5-10 H    (0-5)  


 


Ur Epithelial Cells  Moderate    


 


Amorphous Sediment  Few    


 


Urine Bacteria  Few    


 


Urine Mucus  Moderate    


 


Urine Other  See note    














  19 Range/Units





  21:56 


 


WBC   (4.5-11.0)  K/uL


 


RBC   (3.30-5.50)  M/uL


 


Hgb   (12.0-15.0)  g/dL


 


Hct   (36.0-48.0)  %


 


MCV   (80-98)  fL


 


MCH   (27-31)  pg


 


MCHC   (32-36)  %


 


Plt Count   (150-400)  K/uL


 


Neut % (Auto)   (36-66)  %


 


Lymph % (Auto)   (24-44)  %


 


Mono % (Auto)   (2-6)  %


 


Eos % (Auto)   (2-4)  %


 


Baso % (Auto)   (0-1)  %


 


Sodium   (140-148)  mmol/L


 


Potassium   (3.6-5.2)  mmol/L


 


Chloride   (100-108)  mmol/L


 


Carbon Dioxide   (21-32)  mmol/L


 


Anion Gap   (5.0-14.0)  mmol/L


 


BUN   (7-18)  mg/dL


 


Creatinine   (0.6-1.0)  mg/dL


 


Est Cr Clr Drug Dosing   mL/min


 


Estimated GFR (MDRD)   (>60)  


 


Glucose   ()  mg/dL


 


Lactic Acid  1.0  (0.4-2.0)  mmol/L


 


Calcium   (8.5-10.1)  mg/dL


 


Total Bilirubin   (0.2-1.0)  mg/dL


 


AST   (15-37)  U/L


 


ALT   (12-78)  U/L


 


Alkaline Phosphatase   ()  U/L


 


Total Protein   (6.4-8.2)  g/dL


 


Albumin   (3.4-5.0)  g/dL


 


Globulin   (2.3-3.5)  g/dL


 


Albumin/Globulin Ratio   (1.2-2.2)  


 


Procalcitonin   ng/mL


 


Urine Color   


 


Urine Appearance   


 


Urine pH   (4.5-8.0)  


 


Ur Specific Gravity   (1.008-1.030)  


 


Urine Protein   (NEGATIVE)  mg/dL


 


Urine Glucose (UA)   (NEGATIVE)  mg/dL


 


Urine Ketones   (NEGATIVE)  mg/dL


 


Urine Occult Blood   (NEGATIVE)  


 


Urine Nitrite   (NEGATIVE)  


 


Urine Bilirubin   (NEGATIVE)  


 


Urine Urobilinogen   (NORMAL)  mg/dL


 


Ur Leukocyte Esterase   (NEGATIVE)  


 


Urine RBC   (0-5)  


 


Urine WBC   (0-5)  


 


Ur Epithelial Cells   


 


Amorphous Sediment   


 


Urine Bacteria   


 


Urine Mucus   


 


Urine Other   











Result Diagrams: 


 19 21:56





 19 21:56





- Problem List


(1) Dehydration


SNOMED Code(s): 70404516


   ICD Code: E86.0 - DEHYDRATION   Status: Acute   Priority: High   Current 

Visit: Yes   





(2) Weakness


SNOMED Code(s): 28585922


   ICD Code: R53.1 - WEAKNESS   Status: Acute   Priority: High   Current Visit: 

Yes   





(3) Atrial fibrillation


SNOMED Code(s): 38992391


   ICD Code: I48.91 - UNSPECIFIED ATRIAL FIBRILLATION   Status: Acute   Priority

: High   Current Visit: Yes   


Qualifiers: 


   Atrial fibrillation type: unspecified   Qualified Code(s): I48.91 - 

Unspecified atrial fibrillation   


Problem List Initiated/Reviewed/Updated: Yes


Orders Last 24hrs: 


 Active Orders 24 hr











 Category Date Time Status


 


 Patient Status Manage Transfer [TRANSFER] Routine ADT  19 01:03 Ordered


 


 CULTURE BLOOD [BC] Urgent Lab  19 22:00 Received


 


 CULTURE BLOOD [BC] Urgent Lab  19 22:10 Received


 


 Sodium Chloride 0.9% [Normal Saline] 1,000 ml Med  19 22:15 Active





 IV ASDIRECTED   


 


 Blood Culture x2 Reflex Set [OM.PC] Urgent Oth  19 21:56 Ordered


 


 Resuscitation Status Routine Resus Stat  19 01:06 Ordered








 Medication Orders





Sodium Chloride (Normal Saline)  1,000 mls @ 500 mls/hr IV ASDIRECTED [facility]


   Last Admin: 19 22:28  Dose: 500 mls/hr








Assessment/Plan Comment:: 








ASSESSMENT / PLAN


This is a 88 year old female presents to ER via POV with concerns of sudden 

onset of weakness with mild nausea. She was unable to get up from chair, was 

trying to use the TV remote to call her family. Prior to today's event Mrs. Conley has been able to ambulate and did not have any impairment prior to 

today. Family reports fever of 102.8 at home. reports not feeling well for 3 to 

4 days. 





labs done is ER , CBC; wbc 3.2, hgb 10.4, hct 31.2, plt 79 BMP Na+134, K+3.7, 

cl 101, anion gap 9.7, bun 21, cr 0.9, glucose 59., Urine with micro + small 

leukocyts, WBC 5-10, RBC 5-10, blood cultures x 2 pending


Imaging; CT abdomen-pelvis with contrast : non specific probable mild ileus and 

small amount of free pelvic fluid, etiology unclear. moderate distention of the 

urinary bladder and mild bilateral hydroureteronephrosis. non acute additional 

findings.





Prior to transfer to [address] [place] [address], Mrs. Conley was noted to have heart rate 130, 

telemetry and EKG shows Atrial Fib. She denies any chest pain, shortness of 

breath or any symptoms. Blood pressure 115/67.  unknown length of time in A.Fib

, will treat with Metoprolol 25 mg po once , if does not responds will consider 

Digoxin 125 mcg. consulted with Dr. Dockery.





Will plan to admit OBS to treat symptoms and reassess in am.





Weakness


-IV Rocephin 1 gram, for early bladder infection.


-IV hydration with NS at 100ml/hr


-referral to OT and PT for discharge planning


-blood cultures x 2 pending


-reassess in am





Atrial Fibrillation


-give Metoprolol 25 mg po now


-if heart rate does not respond to Metoprolol may consider Digoxin 125mcg po 

once


-telemetry


-vital sign every 4 hours. 





Maintenance issues


-Orders home meds: has one Tylenol, vitamin, HTN medication


-Nutrition: Regular diet


-Howe catheter not indicated at this time


-DVT:SCD


-GI Prophalaxis; Protonix 40mg daily





CODE STATUS: Full





Admission status: Admit to Observation


-I expect this patient to stay less than 24 hours, not to exceed 96 hours for 

evaluation and management of this problem.





Disposition: home with family





Primary care provider:KENA Morin





Hospitalist: Dr. Dockery

## 2019-06-06 NOTE — PCM.PN
- General Info


Date of Service: 06/06/19


Subjective Update: 





Leny was admitted last night for management of generalized weakness and 

presumed urinary tract infection. Shortly after admission she went into atrial 

fibrillation with a rapid ventricular response. She is asymptomatic with the 

rhythm but has fairly impressive tachycardia when she is up and moving around 

with rates up to 150. At rest the rate is 110-120. No fevers since admission. 

Urine culture was set up this morning. In general the patient says she feels 

fairly well and had a good breakfast. Blood pressure is on the low side of 

normal. She complains of left hip pain but has no other pain concerns. She did 

have a large incontinent mixture of stool and urine.


Functional Status: Reports: Pain Controlled, Tolerating Diet





- Review of Systems


General: Reports: Weakness


Pulmonary: Denies: Shortness of Breath





- Patient Data


Vitals - Most Recent: 


 Last Vital Signs











Temp  36.6 C   06/06/19 07:23


 


Pulse  84   06/06/19 07:23


 


Resp  16   06/06/19 07:23


 


BP  88/48 L  06/06/19 07:23


 


Pulse Ox  97   06/06/19 07:23











Weight - Most Recent: 69.309 kg


I&O - Last 24 Hours: 


 Intake & Output











 06/05/19 06/06/19 06/06/19





 22:59 06:59 14:59


 


Output Total  1350 


 


Balance  -1350 











Lab Results Last 24 Hours: 


 Laboratory Results - last 24 hr











  06/05/19 06/05/19 06/05/19 Range/Units





  21:44 21:56 21:56 


 


WBC   3.2 L   (4.5-11.0)  K/uL


 


RBC   3.34   (3.30-5.50)  M/uL


 


Hgb   10.4 L   (12.0-15.0)  g/dL


 


Hct   31.2 L   (36.0-48.0)  %


 


MCV   93   (80-98)  fL


 


MCH   31   (27-31)  pg


 


MCHC   33   (32-36)  %


 


Plt Count   79 L   (150-400)  K/uL


 


Neut % (Auto)   39   (36-66)  %


 


Lymph % (Auto)   44   (24-44)  %


 


Mono % (Auto)   13 H   (2-6)  %


 


Eos % (Auto)   0 L   (2-4)  %


 


Baso % (Auto)   4 H   (0-1)  %


 


Sodium    134 L  (140-148)  mmol/L


 


Potassium    3.7  (3.6-5.2)  mmol/L


 


Chloride    101  (100-108)  mmol/L


 


Carbon Dioxide    27  (21-32)  mmol/L


 


Anion Gap    9.7  (5.0-14.0)  mmol/L


 


BUN    21 H  (7-18)  mg/dL


 


Creatinine    0.9  (0.6-1.0)  mg/dL


 


Est Cr Clr Drug Dosing    37.31  mL/min


 


Estimated GFR (MDRD)    59 L  (>60)  


 


Glucose    113 H  ()  mg/dL


 


Lactic Acid     (0.4-2.0)  mmol/L


 


Calcium    8.9  (8.5-10.1)  mg/dL


 


Total Bilirubin    0.6  D  (0.2-1.0)  mg/dL


 


AST    53 H D  (15-37)  U/L


 


ALT    44  D  (12-78)  U/L


 


Alkaline Phosphatase    71  ()  U/L


 


Total Protein    5.9 L  (6.4-8.2)  g/dL


 


Albumin    3.1 L  (3.4-5.0)  g/dL


 


Globulin    2.8  (2.3-3.5)  g/dL


 


Albumin/Globulin Ratio    1.1 L  (1.2-2.2)  


 


Procalcitonin    0.92  ng/mL


 


Urine Color  Orange    


 


Urine Appearance  Clear    


 


Urine pH  6.0    (4.5-8.0)  


 


Ur Specific Gravity  1.015    (1.008-1.030)  


 


Urine Protein  30 H    (NEGATIVE)  mg/dL


 


Urine Glucose (UA)  Normal    (NEGATIVE)  mg/dL


 


Urine Ketones  Negative    (NEGATIVE)  mg/dL


 


Urine Occult Blood  Moderate    (NEGATIVE)  


 


Urine Nitrite  Negative    (NEGATIVE)  


 


Urine Bilirubin  Small    (NEGATIVE)  


 


Urine Urobilinogen  8    (NORMAL)  mg/dL


 


Ur Leukocyte Esterase  Small    (NEGATIVE)  


 


Urine RBC  5-10 H    (0-5)  


 


Urine WBC  5-10 H    (0-5)  


 


Ur Epithelial Cells  Moderate    


 


Amorphous Sediment  Few    


 


Urine Bacteria  Few    


 


Urine Mucus  Moderate    


 


Urine Other  See note    














  06/05/19 Range/Units





  21:56 


 


WBC   (4.5-11.0)  K/uL


 


RBC   (3.30-5.50)  M/uL


 


Hgb   (12.0-15.0)  g/dL


 


Hct   (36.0-48.0)  %


 


MCV   (80-98)  fL


 


MCH   (27-31)  pg


 


MCHC   (32-36)  %


 


Plt Count   (150-400)  K/uL


 


Neut % (Auto)   (36-66)  %


 


Lymph % (Auto)   (24-44)  %


 


Mono % (Auto)   (2-6)  %


 


Eos % (Auto)   (2-4)  %


 


Baso % (Auto)   (0-1)  %


 


Sodium   (140-148)  mmol/L


 


Potassium   (3.6-5.2)  mmol/L


 


Chloride   (100-108)  mmol/L


 


Carbon Dioxide   (21-32)  mmol/L


 


Anion Gap   (5.0-14.0)  mmol/L


 


BUN   (7-18)  mg/dL


 


Creatinine   (0.6-1.0)  mg/dL


 


Est Cr Clr Drug Dosing   mL/min


 


Estimated GFR (MDRD)   (>60)  


 


Glucose   ()  mg/dL


 


Lactic Acid  1.0  (0.4-2.0)  mmol/L


 


Calcium   (8.5-10.1)  mg/dL


 


Total Bilirubin   (0.2-1.0)  mg/dL


 


AST   (15-37)  U/L


 


ALT   (12-78)  U/L


 


Alkaline Phosphatase   ()  U/L


 


Total Protein   (6.4-8.2)  g/dL


 


Albumin   (3.4-5.0)  g/dL


 


Globulin   (2.3-3.5)  g/dL


 


Albumin/Globulin Ratio   (1.2-2.2)  


 


Procalcitonin   ng/mL


 


Urine Color   


 


Urine Appearance   


 


Urine pH   (4.5-8.0)  


 


Ur Specific Gravity   (1.008-1.030)  


 


Urine Protein   (NEGATIVE)  mg/dL


 


Urine Glucose (UA)   (NEGATIVE)  mg/dL


 


Urine Ketones   (NEGATIVE)  mg/dL


 


Urine Occult Blood   (NEGATIVE)  


 


Urine Nitrite   (NEGATIVE)  


 


Urine Bilirubin   (NEGATIVE)  


 


Urine Urobilinogen   (NORMAL)  mg/dL


 


Ur Leukocyte Esterase   (NEGATIVE)  


 


Urine RBC   (0-5)  


 


Urine WBC   (0-5)  


 


Ur Epithelial Cells   


 


Amorphous Sediment   


 


Urine Bacteria   


 


Urine Mucus   


 


Urine Other   











Med Orders - Current: 


 Current Medications





Acetaminophen (Tylenol)  650 mg PO Q4H PRN


   PRN Reason: Pain


   Last Admin: 06/06/19 09:01 Dose:  325 mg


Albuterol (Proventil Neb Soln)  2.5 mg NEB Q4H PRN


   PRN Reason: Shortness Of Breath/wheezing


Bisacodyl (Dulcolax)  5 mg PO DAILY PRN


   PRN Reason: Constipation


Docusate Sodium (Colace)  100 mg PO BID PRN


   PRN Reason: Constipation


Ceftriaxone Sodium 1 gm/ (Sodium Chloride)  50 mls @ 100 mls/hr IV Q24H [facility]


   Last Admin: 06/06/19 02:42 Dose:  100 mls/hr


Sodium Chloride (Normal Saline)  1,000 mls @ 100 mls/hr IV ASDIRECTED [facility]


   Last Admin: 06/06/19 10:20 Dose:  100 mls/hr


Amiodarone HCl 450 mg/ (Dextrose/Water)  250 mls @ 33.33 mls/hr IV ASDIRECTED 

[facility]; Protocol


Amiodarone HCl/Dextrose 150 mg (/ Premix)  100 mls @ 400 mls/hr IV ONETIME ONE


   Stop: 06/06/19 11:14


Ondansetron HCl (Zofran Odt)  4 mg PO Q6H PRN


   PRN Reason: Nausea able to take PO


Ondansetron HCl (Zofran)  4 mg IV Q4H PRN


   PRN Reason: Nausea/Vomiting


Pantoprazole Sodium (Protonix***)  40 mg PO ACBREAKFAST [facility]


   Last Admin: 06/06/19 08:49 Dose:  40 mg





Discontinued Medications





Sodium Chloride (Normal Saline)  1,000 mls @ 500 mls/hr IV ASDIRECTED [facility]


   Last Admin: 06/05/19 22:28 Dose:  500 mls/hr


Sodium Chloride (Normal Saline)  70 mls @ 3 mls/sec IV ASDIRECTED [facility]


   Stop: 06/05/19 23:20


   Last Admin: 06/05/19 23:37 Dose:  3 mls/sec


Iopamidol (Isovue-300 (61%))  100 ml IV .AS DIRECTED [facility]


   Stop: 06/05/19 23:20


   Last Admin: 06/05/19 23:37 Dose:  100 ml


Metoprolol Tartrate (Lopressor)  25 mg PO ONETIME ONE


   Stop: 06/06/19 01:41


   Last Admin: 06/06/19 02:43 Dose:  25 mg


Triamterene/HCTZ (Maxzide 25-37.5 Mg)  0.5 each PO DAILY [facility]


   Last Admin: 06/06/19 08:49 Dose:  0.5 each











- Exam


Quality Assessment: No: Supplemental Oxygen


General: Alert, Oriented, Cooperative, No Acute Distress


Lungs: Clear to Auscultation, Normal Respiratory Effort


Cardiovascular: No Murmurs, Irregular Rhythm, Tachycardia


GI/Abdominal Exam: Soft, No Distention


Extremities: No Pedal Edema.  No: Increased Warmth


Skin: Warm, Dry


Psy/Mental Status: Alert, Normal Affect





- Problem List & Annotations


(1) Acute cystitis without hematuria


SNOMED Code(s): 92238510


   Code(s): N30.00 - ACUTE CYSTITIS WITHOUT HEMATURIA   Status: Acute   Current 

Visit: Yes   





(2) Paroxysmal atrial fibrillation with rapid ventricular response


SNOMED Code(s): 403841675, 165223051942571


   Code(s): I48.0 - PAROXYSMAL ATRIAL FIBRILLATION   Status: Acute   Current 

Visit: Yes   





(3) Essential hypertension


SNOMED Code(s): 77833459


   Code(s): I10 - ESSENTIAL (PRIMARY) HYPERTENSION   Status: Chronic   Current 

Visit: No   





- Problem List Review


Problem List Initiated/Reviewed/Updated: Yes





- My Orders


Last 24 Hours: 


My Active Orders





06/06/19 09:59


CULTURE URINE [RM] Routine 





06/06/19 10:35


BASIC METABOLIC PANEL,BMP [CHEM] Urgent 


TSH ULTRASENSITIVE [CHEM] Urgent 





06/06/19 10:36


Transfer Patient (Change bed) [ADT] Routine 





06/06/19 10:37


Admission Status [Patient Status] [ADT] Routine 





06/06/19 10:38


Cardiac Monitoring [RC] .As Directed 





06/06/19 10:41


MAGNESIUM [CHEM] Urgent 





06/06/19 10:45


Amiodarone 450 MG in D5W @ 1 MG/MIN(250ml) Amiodarone [Cordarone] 450 mg   

Dextrose 5% in Water 241 ml IV ASDIRECTED 





06/06/19 11:00


Amiodarone In Dextrose,Iso-Osm [Nexterone in Dextrose 150 MG/100 ML] 150 mg   

Premix Bag 1 bag IV ONETIME 














- Plan


Plan:: 








ASSESSMENT / PLAN





Probable urinary tract infection - urinary symptoms and dark foul-smelling 

urine. Pro calcitonin was elevated at nearly 1 which is strongly suggestive of 

bacterial infection. Though the criteria for infection were not impressive on 

the urinalysis. Febrile prior to admission but none since. Generalized weakness 

likely related to infection.


-Continue ceftriaxone


-Gentle IV fluids


-Follow-up cultures


-Physical therapy for strengthening





Paroxysmal atrial fibrillation with rapid ventricular response - patient went 

into atrial fibrillation last night. She has ordered


Hypotensive at this time with no room for beta blocker or calcium channel 

blocker at this time.


-Transfer to the intensive care unit for Amiodarone load and infusion


-Cardiac monitoring


-Echocardiogram


-Magnesium level





Essential hypertension - patient normally on combination of hydrochlorothiazide/

triamterene. Blood pressure is low normal at this time.


-Discontinue antihypertensive





Maintenance issues


-Nutrition: Regular diet


-Howe catheter not indicated at this time


-DVT:SCD


-GI Prophalaxis; Protonix 40mg daily





Admission status: patient was initially admitted to inpatient status. With 

probable infection and poorly controlled atrial fibrillation with hypotension 

the patient will be transitioned to inpatient status at this time.





Disposition: home with family





Devendra Dockery M.D.

## 2019-06-06 NOTE — CRLCT
INDICATION: Lower abdominal pain. 



CT ABDOMEN AND PELVIS WITH CONTRAST



TECHNIQUE:  Multidetector CT imaging was performed through the abdomen and 

pelvis following intravenous contrast administration using 100 mL Isovue 

300.  Coronal and sagittal reconstructions were generated.



COMPARISON:  None.



FINDINGS:



Lower chest:  Mild bibasilar lung atelectasis or scarring.



Liver:  No focal liver lesion identified. 



Gallbladder and bile ducts:  Status post cholecystectomy.  Moderate 

intrahepatic and extrahepatic biliary dilation; this may merely reflect 

post cholecystectomy reservoir effect but correlation with LFTs is 

suggested to exclude biliary obstruction.



Pancreas:  Unremarkable.



Spleen:  Normal.



Adrenals:  No nodules or masses.



Kidneys, ureters, and urinary bladder:  2 centimeter cyst at the lower pole 

of the left kidney.  Moderate distention of the urinary bladder and mild 

dilation of the ureters and intrarenal collecting systems bilaterally.



Gastrointestinal tract:     Nonspecific mild increase in small bowel gas 

and fluid, most likely representing mild ileus. No definite bowel 

obstruction or wall thickening. Appendix not identified. No findings 

suggestive of appendicitis in the right lower quadrant. Multiple sigmoid 

colon diverticula, without evidence of diverticulitis.



Vascular structures:  Normal caliber abdominal aorta with mild aortoiliac 

atherosclerotic calcifications.



Peritoneum:  Mild free fluid identified in the lower right pelvis. No 

loculated collection suggestive of abscess.  No free air is seen.



Lymph nodes:  No pathologically enlarged nodes identified.



Reproductive organs:  No pelvic masses.



Bones:  Moderate multilevel spondylosis. Bilateral hip DJD, most severe on 

the left.



IMPRESSION:



1. Nonspecific probable mild ileus and small amount of free pelvic fluid, 

etiology unclear.



2. Status post cholecystectomy. Moderate dilation of the biliary tree is 

noted and may reflect post cholecystectomy reservoir effect, but 

correlation with LFTs is suggested to exclude biliary obstruction. 



3. Moderate distention of the urinary bladder and mild bilateral 

hydroureteronephrosis. 



4. Nonacute additional findings as detailed above. 



DALLIN LOVE MD



[facility]



Dictated by Miguel Love MD @ 6/6/2019 12:14:06 AM



Dictated by: Miguel Love MD @ 06/06/2019 00:14:17



(Electronically Signed)

## 2019-06-07 NOTE — PCM.PN
- General Info


Date of Service: 06/07/19


Subjective Update: 





Patient has remained in sinus rhythm with intermittent bradycardia but overall 

acceptable heart rate. She is very weak and has difficulty with standing and 

pivoting today. She did have a fever yesterday afternoon and was confused at 

that time. Blood pressure has remained stable. Urine culture is not displaying 

any growth at this time. Appetite has been fair.


Functional Status: Reports: Pain Controlled, Tolerating Diet





- Review of Systems


General: Reports: Fever, Weakness


Gastrointestinal: Denies: Abdominal Pain, Diarrhea





- Patient Data


Vitals - Most Recent: 


 Last Vital Signs











Temp  36.7 C   06/07/19 04:00


 


Pulse  59 L  06/07/19 06:00


 


Resp  17   06/07/19 06:00


 


BP  108/42 L  06/07/19 06:00


 


Pulse Ox  98   06/07/19 06:00











Weight - Most Recent: 69.309 kg


I&O - Last 24 Hours: 


 Intake & Output











 06/06/19 06/07/19 06/07/19





 22:59 06:59 14:59


 


Intake Total 863 826 


 


Output Total 350 350 


 


Balance 513 476 











Lab Results Last 24 Hours: 


 Laboratory Results - last 24 hr











  06/06/19 06/06/19 06/07/19 Range/Units





  10:44 10:44 05:00 


 


WBC    4.5  (4.5-11.0)  K/uL


 


RBC    3.32  (3.30-5.50)  M/uL


 


Hgb    9.9 L  (12.0-15.0)  g/dL


 


Hct    31.1 L  (36.0-48.0)  %


 


MCV    94  (80-98)  fL


 


MCH    30  (27-31)  pg


 


MCHC    32  (32-36)  %


 


Plt Count    103 L  (150-400)  K/uL


 


Sodium  138 L    (140-148)  mmol/L


 


Potassium  3.7    (3.6-5.2)  mmol/L


 


Chloride  104    (100-108)  mmol/L


 


Carbon Dioxide  25    (21-32)  mmol/L


 


Anion Gap  12.7    (5.0-14.0)  mmol/L


 


BUN  15    (7-18)  mg/dL


 


Creatinine  1.0    (0.6-1.0)  mg/dL


 


Est Cr Clr Drug Dosing  30.49    mL/min


 


Estimated GFR (MDRD)  52 L    (>60)  


 


Glucose  159 H    ()  mg/dL


 


Calcium  8.8    (8.5-10.1)  mg/dL


 


Magnesium   1.5 L   (1.8-2.4)  mg/dL


 


TSH, Ultra Sensitive  2.116    (0.358-3.740)  uIU/mL














  06/07/19 Range/Units





  05:00 


 


WBC   (4.5-11.0)  K/uL


 


RBC   (3.30-5.50)  M/uL


 


Hgb   (12.0-15.0)  g/dL


 


Hct   (36.0-48.0)  %


 


MCV   (80-98)  fL


 


MCH   (27-31)  pg


 


MCHC   (32-36)  %


 


Plt Count   (150-400)  K/uL


 


Sodium  137 L  (140-148)  mmol/L


 


Potassium  3.8  (3.6-5.2)  mmol/L


 


Chloride  104  (100-108)  mmol/L


 


Carbon Dioxide  25  (21-32)  mmol/L


 


Anion Gap  11.8  (5.0-14.0)  mmol/L


 


BUN  19 H  (7-18)  mg/dL


 


Creatinine  1.0  (0.6-1.0)  mg/dL


 


Est Cr Clr Drug Dosing  30.49  mL/min


 


Estimated GFR (MDRD)  52 L  (>60)  


 


Glucose  102  ()  mg/dL


 


Calcium  8.6  (8.5-10.1)  mg/dL


 


Magnesium   (1.8-2.4)  mg/dL


 


TSH, Ultra Sensitive   (0.358-3.740)  uIU/mL











[facility]uman Results Last 24 Hours: 


 Microbiology











 06/06/19 09:59 Urine Culture - Preliminary





 Urine, Clean Catch    NO GROWTH AFTER 1 DAY


 


 06/05/19 22:10 Aerobic Blood Culture - Preliminary





 Blood - Venous - Lab Draw    NO GROWTH AFTER 1 DAY





 Anaerobic Blood Culture - Preliminary





    NO GROWTH AFTER 1 DAY


 


 06/05/19 22:00 Aerobic Blood Culture - Preliminary





 Blood - Venous    NO GROWTH AFTER 1 DAY





 Anaerobic Blood Culture - Preliminary





    NO GROWTH AFTER 1 DAY











Med Orders - Current: 


 Current Medications





Acetaminophen (Tylenol)  650 mg PO Q4H PRN


   PRN Reason: Pain


   Last Admin: 06/07/19 09:37 Dose:  650 mg


Albuterol (Proventil Neb Soln)  2.5 mg NEB Q4H PRN


   PRN Reason: Shortness Of Breath/wheezing


Amiodarone HCl (Cordarone)  200 mg PO DAILY [facility]


Bisacodyl (Dulcolax)  5 mg PO DAILY PRN


   PRN Reason: Constipation


Docusate Sodium (Colace)  100 mg PO BID PRN


   PRN Reason: Constipation


Ceftriaxone Sodium 1 gm/ (Sodium Chloride)  50 mls @ 100 mls/hr IV Q24H [facility]


   Last Admin: 06/07/19 01:46 Dose:  100 mls/hr


Ondansetron HCl (Zofran Odt)  4 mg PO Q6H PRN


   PRN Reason: Nausea able to take PO


Ondansetron HCl (Zofran)  4 mg IV Q4H PRN


   PRN Reason: Nausea/Vomiting


Pantoprazole Sodium (Protonix***)  40 mg PO ACBREAKFAST [facility]


   Last Admin: 06/07/19 08:03 Dose:  40 mg





Discontinued Medications





Sodium Chloride (Normal Saline)  1,000 mls @ 500 mls/hr IV ASDIRECTED [facility]


   Last Admin: 06/05/19 22:28 Dose:  500 mls/hr


Sodium Chloride (Normal Saline)  70 mls @ 3 mls/sec IV ASDIRECTED [facility]


   Stop: 06/05/19 23:20


   Last Admin: 06/05/19 23:37 Dose:  3 mls/sec


Sodium Chloride (Normal Saline)  1,000 mls @ 100 mls/hr IV ASDIRECTED [facility]


   Last Admin: 06/06/19 10:20 Dose:  100 mls/hr


Amiodarone HCl 450 mg/ (Dextrose/Water)  250 mls @ 33.33 mls/hr IV ASDIRECTED 

[facility]; Protocol


   Last Admin: 06/06/19 11:25 Dose:  1 mg/min, 33.33 mls/hr


Amiodarone HCl/Dextrose 150 mg (/ Premix)  100 mls @ 400 mls/hr IV ONETIME ONE


   Stop: 06/06/19 11:14


   Last Admin: 06/06/19 11:07 Dose:  400 mls/hr


Sodium Chloride (Normal Saline)  1,000 mls @ 25 mls/hr IV ASDIRECTED [facility]


Magnesium Sulfate 2 gm/ Premix  50 mls @ 12.5 mls/hr IV ONETIME ONE


   Stop: 06/06/19 16:59


   Last Admin: 06/06/19 12:59 Dose:  12.5 mls/hr


Iopamidol (Isovue-300 (61%))  100 ml IV .AS DIRECTED [facility]


   Stop: 06/05/19 23:20


   Last Admin: 06/05/19 23:37 Dose:  100 ml


Metoprolol Tartrate (Lopressor)  25 mg PO ONETIME ONE


   Stop: 06/06/19 01:41


   Last Admin: 06/06/19 02:43 Dose:  25 mg


Triamterene/HCTZ (Maxzide 25-37.5 Mg)  0.5 each PO DAILY [facility]


   Last Admin: 06/06/19 08:49 Dose:  0.5 each











- Exam


Quality Assessment: Supplemental Oxygen


General: Alert, Oriented, Cooperative, No Acute Distress


Neck: Supple


Lungs: Clear to Auscultation, Normal Respiratory Effort, Decreased Breath 

Sounds (mild both bases)


Cardiovascular: Regular Rate, Regular Rhythm


GI/Abdominal Exam: Soft, No Distention


Extremities: No Pedal Edema.  No: Increased Warmth


Skin: Warm, Dry


Psy/Mental Status: Alert, Normal Affect





- Problem List & Annotations


(1) Acute cystitis without hematuria


SNOMED Code(s): 98967485


   Code(s): N30.00 - ACUTE CYSTITIS WITHOUT HEMATURIA   Status: Acute   Current 

Visit: Yes   





(2) Paroxysmal atrial fibrillation with rapid ventricular response


SNOMED Code(s): 130627094, 720487099644049


   Code(s): I48.0 - PAROXYSMAL ATRIAL FIBRILLATION   Status: Acute   Current 

Visit: Yes   





(3) Essential hypertension


SNOMED Code(s): 33160178


   Code(s): I10 - ESSENTIAL (PRIMARY) HYPERTENSION   Status: Chronic   Current 

Visit: No   





- Problem List Review


Problem List Initiated/Reviewed/Updated: Yes





- My Orders


Last 24 Hours: 


My Active Orders





06/06/19 09:59


CULTURE URINE [RM] Routine 





06/06/19 10:36


Transfer Patient (Change bed) [ADT] Routine 





06/06/19 10:37


Admission Status [Patient Status] [ADT] Routine 





06/06/19 10:42


Echo Comp wo Cont [US] Routine 





06/07/19 10:37


Convert IV to Saline Lock [OM.PC] Routine 





06/07/19 10:38


Transfer Patient (Change bed) [ADT] Routine 


Discontinue Telemetry Monitoring [Cardiac Monitoring Discontinue] [RC] Click to 

Edit 





06/07/19 10:45


Amiodarone [Cordarone]   200 mg PO DAILY 





06/08/19 05:00


BASIC METABOLIC PANEL,BMP [CHEM] Timed 


CBC W/O DIFF,HEMOGRAM [HEME] Timed (1) 














- Plan


Plan:: 








ASSESSMENT / PLAN





Probable urinary tract infection - urinary symptoms and elevated pro-

calcitonin. Culture with no growth so far but no other obvious source for 

infection. She is very weak at this time and not safe for outpatient management.


-Continue ceftriaxone


-Saline lock IV fluids


-Follow-up cultures


-Physical therapy for strengthening





Paroxysmal atrial fibrillation with rapid ventricular response - patient is 

back in sinus rhythm with amiodarone. Echocardiogram showed good cardiac 

function and no major valve abnormalities.


-Transition to oral amiodarone today, anticipate short course, possibly 2 weeks


-Discontinue cardiac monitoring





Essential hypertension - patient normally on combination of hydrochlorothiazide/

triamterene. Blood pressure is low normal at this time.


-Discontinue antihypertensive





Maintenance issues - 


-Nutrition - Regular diet


-Howe catheter - not indicated at this time


-DVT - SCD


-GI Prophalaxis - Protonix 40mg daily





Admission status - patient was initially admitted to inpatient status. With 

probable infection and poorly controlled atrial fibrillation with hypotension 

the patient will be transitioned to inpatient status at this time.





Disposition - I would anticipate discharge home with home care versus probably 

more likely a short rehabilitation stay at the nursing home





Devendra Dockery M.D.

## 2019-06-08 NOTE — PCM.PN
- General Info


Date of Service: 06/08/19


Subjective Update: 





There were no acute events overnight. Pt did have an episode with generalized 

weakness, mild confusion and possible slight facial droop yesterday afternoon 

that resolved after a short while. No recurrence. No fevers overnight. Still 

very weak. BP remains on the low side of normal. She went back into atrial 

fibrillation overnight and has had mild tachycardia. No complaints of shortness 

of breath or abdominal pain. No diarrhea. Urine culture remains negative as do 

the blood cultures.


Functional Status: Reports: Pain Controlled, Tolerating Diet





- Review of Systems


General: Reports: Weakness.  Denies: Fever





- Patient Data


Vitals - Most Recent: 


 Last Vital Signs











Temp  37.1 C   06/08/19 04:00


 


Pulse  98   06/08/19 07:55


 


Resp  18   06/08/19 07:55


 


BP  96/54 L  06/08/19 07:55


 


Pulse Ox  95   06/08/19 07:55











Weight - Most Recent: 69.309 kg


I&O - Last 24 Hours: 


 Intake & Output











 06/07/19 06/08/19 06/08/19





 22:59 06:59 14:59


 


Intake Total 720 240 


 


Output Total 600 550 


 


Balance 120 -310 











Lab Results Last 24 Hours: 


 Laboratory Results - last 24 hr











  06/08/19 06/08/19 Range/Units





  05:43 05:43 


 


WBC  5.7   (4.5-11.0)  K/uL


 


RBC  3.48   (3.30-5.50)  M/uL


 


Hgb  10.4 L   (12.0-15.0)  g/dL


 


Hct  32.4 L   (36.0-48.0)  %


 


MCV  93   (80-98)  fL


 


MCH  30   (27-31)  pg


 


MCHC  32   (32-36)  %


 


Plt Count  137 L   (150-400)  K/uL


 


Sodium   136 L  (140-148)  mmol/L


 


Potassium   3.7  (3.6-5.2)  mmol/L


 


Chloride   104  (100-108)  mmol/L


 


Carbon Dioxide   25  (21-32)  mmol/L


 


Anion Gap   10.7  (5.0-14.0)  mmol/L


 


BUN   16  (7-18)  mg/dL


 


Creatinine   0.9  (0.6-1.0)  mg/dL


 


Est Cr Clr Drug Dosing   33.88  mL/min


 


Estimated GFR (MDRD)   59 L  (>60)  


 


Glucose   103  ()  mg/dL


 


Calcium   8.3 L  (8.5-10.1)  mg/dL











[facility]uman Results Last 24 Hours: 


 Microbiology











 06/06/19 09:59 Urine Culture - Final





 Urine, Clean Catch    NO GROWTH AFTER 2 DAYS


 


 06/05/19 22:00 Aerobic Blood Culture - Preliminary





 Blood - Venous    NO GROWTH AFTER 2 DAYS





 Anaerobic Blood Culture - Preliminary





    NO GROWTH AFTER 2 DAYS


 


 06/05/19 22:10 Aerobic Blood Culture - Preliminary





 Blood - Venous - Lab Draw    NO GROWTH AFTER 2 DAYS





 Anaerobic Blood Culture - Preliminary





    NO GROWTH AFTER 2 DAYS











Med Orders - Current: 


 Current Medications





Acetaminophen (Tylenol)  650 mg PO Q4H PRN


   PRN Reason: Pain


   Last Admin: 06/07/19 09:37 Dose:  650 mg


Albuterol (Proventil Neb Soln)  2.5 mg NEB Q4H PRN


   PRN Reason: Shortness Of Breath/wheezing


Amiodarone HCl (Cordarone)  200 mg PO BID [facility]


Bisacodyl (Dulcolax)  5 mg PO DAILY PRN


   PRN Reason: Constipation


Cefdinir (Omnicef)  300 mg PO BID [facility]


Docusate Sodium (Colace)  100 mg PO BID PRN


   PRN Reason: Constipation


Ondansetron HCl (Zofran Odt)  4 mg PO Q6H PRN


   PRN Reason: Nausea able to take PO


Ondansetron HCl (Zofran)  4 mg IV Q4H PRN


   PRN Reason: Nausea/Vomiting


Pantoprazole Sodium (Protonix***)  40 mg PO ACBREAKFAST [facility]


   Last Admin: 06/08/19 07:44 Dose:  40 mg





Discontinued Medications





Amiodarone HCl (Cordarone)  200 mg PO DAILY [facility]


   Last Admin: 06/08/19 08:20 Dose:  200 mg


Sodium Chloride (Normal Saline)  1,000 mls @ 500 mls/hr IV ASDIRECTED [facility]


   Last Admin: 06/05/19 22:28 Dose:  500 mls/hr


Sodium Chloride (Normal Saline)  70 mls @ 3 mls/sec IV ASDIRECTED [facility]


   Stop: 06/05/19 23:20


   Last Admin: 06/05/19 23:37 Dose:  3 mls/sec


Ceftriaxone Sodium 1 gm/ (Sodium Chloride)  50 mls @ 100 mls/hr IV Q24H [facility]


   Last Admin: 06/08/19 02:43 Dose:  100 mls/hr


Sodium Chloride (Normal Saline)  1,000 mls @ 100 mls/hr IV ASDIRECTED [facility]


   Last Admin: 06/06/19 10:20 Dose:  100 mls/hr


Amiodarone HCl 450 mg/ (Dextrose/Water)  250 mls @ 33.33 mls/hr IV ASDIRECTED 

[facility]; Protocol


   Last Admin: 06/06/19 11:25 Dose:  1 mg/min, 33.33 mls/hr


Amiodarone HCl/Dextrose 150 mg (/ Premix)  100 mls @ 400 mls/hr IV ONETIME ONE


   Stop: 06/06/19 11:14


   Last Admin: 06/06/19 11:07 Dose:  400 mls/hr


Sodium Chloride (Normal Saline)  1,000 mls @ 25 mls/hr IV ASDIRECTED [facility]


Magnesium Sulfate 2 gm/ Premix  50 mls @ 12.5 mls/hr IV ONETIME ONE


   Stop: 06/06/19 16:59


   Last Admin: 06/06/19 12:59 Dose:  12.5 mls/hr


Amiodarone HCl/Dextrose 150 mg (/ Premix)  100 mls @ 400 mls/hr IV NOW ONE


   Stop: 06/08/19 10:29


   Last Admin: 06/08/19 10:10 Dose:  400 mls/hr


Iopamidol (Isovue-300 (61%))  100 ml IV .AS DIRECTED [facility]


   Stop: 06/05/19 23:20


   Last Admin: 06/05/19 23:37 Dose:  100 ml


Metoprolol Tartrate (Lopressor)  25 mg PO ONETIME ONE


   Stop: 06/06/19 01:41


   Last Admin: 06/06/19 02:43 Dose:  25 mg


Triamterene/HCTZ (Maxzide 25-37.5 Mg)  0.5 each PO DAILY [facility]


   Last Admin: 06/06/19 08:49 Dose:  0.5 each











- Exam


Quality Assessment: No: Supplemental Oxygen


General: Alert, Oriented, Cooperative, No Acute Distress


Lungs: Clear to Auscultation, Normal Respiratory Effort, Decreased Breath 

Sounds (mild both bases)


Cardiovascular: Irregular Rhythm, Tachycardia


GI/Abdominal Exam: Soft, No Distention


Extremities: No Pedal Edema.  No: Increased Warmth


Skin: Warm, Dry


Psy/Mental Status: Alert, Normal Affect





- Problem List & Annotations


(1) Acute cystitis without hematuria


SNOMED Code(s): 68816684


   Code(s): N30.00 - ACUTE CYSTITIS WITHOUT HEMATURIA   Status: Acute   Current 

Visit: Yes   





(2) Paroxysmal atrial fibrillation with rapid ventricular response


SNOMED Code(s): 230770319, 017302769181616


   Code(s): I48.0 - PAROXYSMAL ATRIAL FIBRILLATION   Status: Acute   Current 

Visit: Yes   





(3) Essential hypertension


SNOMED Code(s): 85052211


   Code(s): I10 - ESSENTIAL (PRIMARY) HYPERTENSION   Status: Chronic   Current 

Visit: No   





- Problem List Review


Problem List Initiated/Reviewed/Updated: Yes





- My Orders


Last 24 Hours: 


My Active Orders





06/07/19 10:37


Convert IV to Saline Lock [OM.PC] Routine 





06/07/19 10:38


Transfer Patient (Change bed) [ADT] Routine 


Discontinue Telemetry Monitoring [Cardiac Monitoring Discontinue] [RC] Click to 

Edit 





06/08/19 10:47


Cardiac Monitoring [RC] CONTINUOUS 





06/08/19 21:00


Amiodarone [Cordarone]   200 mg PO BID 


Cefdinir [Omnicef]   300 mg PO BID 














- Plan


Plan:: 








ASSESSMENT / PLAN





Probable urinary tract infection - urinary symptoms and elevated pro-

calcitonin. Culture with no growth so far but no other obvious source for 

infection. No recurrence of fever. No new symptoms. She remains extremely weak.


-Transition to oral antibiotics


-Saline lock IV fluids


-Follow-up cultures


-Physical therapy for strengthening





Paroxysmal atrial fibrillation with rapid ventricular response - patient is now 

back and atrial fibrillation. She had her amiodarone loading this morning. 

Blood pressure remains on the low side of normal and diltiazem is not a viable 

option.


-Rebolus with amiodarone and increased oral medication to twice daily


-Consider digoxin if additional rate control as needed


-cardiac monitoring





Essential hypertension - blood pressure remains low.


-Discontinue antihypertensive





Maintenance issues - 


-Nutrition - Regular diet


-Howe catheter - not indicated at this time


-DVT - SCD


-GI Prophalaxis - Protonix 40mg daily





Admission status - patient was initially admitted to inpatient status. With 

probable infection and poorly controlled atrial fibrillation with hypotension 

the patient will be transitioned to inpatient status at this time.





Disposition - I would anticipate discharge to the nursing home on Monday or 

Tuesday





Devendra Dockery M.D.

## 2019-06-09 NOTE — PCM.PN
- General Info


Date of Service: 06/09/19


Subjective Update: 





There were no acute events overnight. The patient remains in atrial 

fibrillation with a controlled rate. She does not feel short of breath. She 

does not have any abdominal pain. She remains very weak and requires extensive 

assistance to get from the bed to the chair. There have been no reports of 

confusion. She has not had any fevers. Appetite has been good. Cultures remain 

negative.


Functional Status: Reports: Pain Controlled, Tolerating Diet





- Review of Systems


General: Reports: Weakness


Pulmonary: Denies: Shortness of Breath





- Patient Data


Vitals - Most Recent: 


 Last Vital Signs











Temp  36.6 C   06/09/19 07:57


 


Pulse  111 H  06/09/19 07:57


 


Resp  18   06/09/19 07:57


 


BP  114/57 L  06/09/19 07:57


 


Pulse Ox  94 L  06/09/19 07:57











Weight - Most Recent: 69.309 kg


I&O - Last 24 Hours: 


 Intake & Output











 06/08/19 06/09/19 06/09/19





 22:59 06:59 14:59


 


Intake Total 750 290 360


 


Output Total 350 625 


 


Balance 400 -335 360











Truman Results Last 24 Hours: 


 Microbiology











 06/05/19 22:00 Aerobic Blood Culture - Preliminary





 Blood - Venous    NO GROWTH AFTER 3 DAYS





 Anaerobic Blood Culture - Preliminary





    NO GROWTH AFTER 3 DAYS


 


 06/05/19 22:10 Aerobic Blood Culture - Preliminary





 Blood - Venous - Lab Draw    NO GROWTH AFTER 3 DAYS





 Anaerobic Blood Culture - Preliminary





    NO GROWTH AFTER 3 DAYS


 


 06/06/19 09:59 Urine Culture - Final





 Urine, Clean Catch    NO GROWTH AFTER 2 DAYS











Med Orders - Current: 


 Current Medications





Acetaminophen (Tylenol)  650 mg PO Q4H PRN


   PRN Reason: Pain


   Last Admin: 06/09/19 07:45 Dose:  650 mg


Albuterol (Proventil Neb Soln)  2.5 mg NEB Q4H PRN


   PRN Reason: Shortness Of Breath/wheezing


Amiodarone HCl (Cordarone)  200 mg PO BID [facility]


   Last Admin: 06/08/19 20:26 Dose:  200 mg


Bisacodyl (Dulcolax)  5 mg PO DAILY PRN


   PRN Reason: Constipation


Cefdinir (Omnicef)  300 mg PO BID [facility]


   Last Admin: 06/08/19 20:26 Dose:  300 mg


Docusate Sodium (Colace)  100 mg PO BID PRN


   PRN Reason: Constipation


   Last Admin: 06/09/19 07:45 Dose:  100 mg


Ondansetron HCl (Zofran Odt)  4 mg PO Q6H PRN


   PRN Reason: Nausea able to take PO


Ondansetron HCl (Zofran)  4 mg IV Q4H PRN


   PRN Reason: Nausea/Vomiting


Pantoprazole Sodium (Protonix***)  40 mg PO ACBREAKFAST [facility]


   Last Admin: 06/09/19 07:45 Dose:  40 mg





Discontinued Medications





Amiodarone HCl (Cordarone)  200 mg PO DAILY [facility]


   Last Admin: 06/08/19 08:20 Dose:  200 mg


Sodium Chloride (Normal Saline)  1,000 mls @ 500 mls/hr IV ASDIRECTED [facility]


   Last Admin: 06/05/19 22:28 Dose:  500 mls/hr


Sodium Chloride (Normal Saline)  70 mls @ 3 mls/sec IV ASDIRECTED [facility]


   Stop: 06/05/19 23:20


   Last Admin: 06/05/19 23:37 Dose:  3 mls/sec


Ceftriaxone Sodium 1 gm/ (Sodium Chloride)  50 mls @ 100 mls/hr IV Q24H [facility]


   Last Admin: 06/08/19 02:43 Dose:  100 mls/hr


Sodium Chloride (Normal Saline)  1,000 mls @ 100 mls/hr IV ASDIRECTED [facility]


   Last Admin: 06/06/19 10:20 Dose:  100 mls/hr


Amiodarone HCl 450 mg/ (Dextrose/Water)  250 mls @ 33.33 mls/hr IV ASDIRECTED 

[facility]; Protocol


   Last Admin: 06/06/19 11:25 Dose:  1 mg/min, 33.33 mls/hr


Amiodarone HCl/Dextrose 150 mg (/ Premix)  100 mls @ 400 mls/hr IV ONETIME ONE


   Stop: 06/06/19 11:14


   Last Admin: 06/06/19 11:07 Dose:  400 mls/hr


Sodium Chloride (Normal Saline)  1,000 mls @ 25 mls/hr IV ASDIRECTED [facility]


Magnesium Sulfate 2 gm/ Premix  50 mls @ 12.5 mls/hr IV ONETIME ONE


   Stop: 06/06/19 16:59


   Last Admin: 06/06/19 12:59 Dose:  12.5 mls/hr


Amiodarone HCl/Dextrose 150 mg (/ Premix)  100 mls @ 400 mls/hr IV NOW ONE


   Stop: 06/08/19 10:29


   Last Admin: 06/08/19 10:10 Dose:  400 mls/hr


Iopamidol (Isovue-300 (61%))  100 ml IV .AS DIRECTED [facility]


   Stop: 06/05/19 23:20


   Last Admin: 06/05/19 23:37 Dose:  100 ml


Metoprolol Tartrate (Lopressor)  25 mg PO ONETIME ONE


   Stop: 06/06/19 01:41


   Last Admin: 06/06/19 02:43 Dose:  25 mg


Triamterene/HCTZ (Maxzide 25-37.5 Mg)  0.5 each PO DAILY [facility]


   Last Admin: 06/06/19 08:49 Dose:  0.5 each











- Exam


Quality Assessment: No: Supplemental Oxygen


General: Alert, Oriented, Cooperative, No Acute Distress


Lungs: Clear to Auscultation, Normal Respiratory Effort


Cardiovascular: Regular Rate, Irregular Rhythm


GI/Abdominal Exam: Soft, No Distention


Extremities: No Pedal Edema.  No: Increased Warmth


Skin: Warm, Dry


Psy/Mental Status: Alert, Normal Affect





- Problem List & Annotations


(1) Acute cystitis without hematuria


SNOMED Code(s): 37643487


   Code(s): N30.00 - ACUTE CYSTITIS WITHOUT HEMATURIA   Status: Acute   Current 

Visit: Yes   





(2) Paroxysmal atrial fibrillation with rapid ventricular response


SNOMED Code(s): 622842413, 083663730523178


   Code(s): I48.0 - PAROXYSMAL ATRIAL FIBRILLATION   Status: Acute   Current 

Visit: Yes   





(3) Essential hypertension


SNOMED Code(s): 98112253


   Code(s): I10 - ESSENTIAL (PRIMARY) HYPERTENSION   Status: Chronic   Current 

Visit: No   





- Problem List Review


Problem List Initiated/Reviewed/Updated: Yes





- My Orders


Last 24 Hours: 


My Active Orders





06/08/19 10:47


Cardiac Monitoring [RC] CONTINUOUS 





06/08/19 14:35


Resuscitation Status Routine 





06/08/19 21:00


Amiodarone [Cordarone]   200 mg PO BID 


Cefdinir [Omnicef]   300 mg PO BID 














- Plan


Plan:: 








ASSESSMENT / PLAN





Probable urinary tract infection - urinary symptoms and elevated pro-

calcitonin. Culture with no growth so far but no other obvious source for 

infection. She has not had any fevers. Still very weak but slowly improving.


-Transition to oral antibiotics


-Saline lock IV fluids


-Follow-up cultures


-Physical therapy for strengthening





Paroxysmal atrial fibrillation with rapid ventricular response - patient is 

back in atrial fibrillation for the second time and has remained there. Rate is 

controlled at this time with amiodarone. CHADSSVASC score is greater than 2 but 

I anticipate she will be back in sinus rhythm in the near future so we will 

defer anticoagulation at this time.


-Continue twice daily amiodarone, anticipate short duration


-Consider digoxin if additional rate control as needed


-cardiac monitoring





Essential hypertension - blood pressure remains on the low side of normal. 

Tolerating amiodarone so far.


-Discontinue antihypertensive





Maintenance issues - 


-Nutrition - Regular diet


-Howe catheter - not indicated at this time


-DVT - SCD


-GI Prophalaxis - Protonix 40mg daily





Admission status - patient was initially admitted to inpatient status. With 

probable infection and poorly controlled atrial fibrillation with hypotension 

the patient will be transitioned to inpatient status at this time.





Disposition - I would anticipate discharge to the nursing home on Monday 





Devendra Dockery M.D.

## 2019-06-09 NOTE — [id number].DCSUM1
**Discharge Summary





- Hospital Course


Brief History: 88-year-old female with history of essential hypertension who 

presented with fever and weakness as well as increased urinary frequency. She 

was admitted for management of presumed urinary tract infection, paroxysmal 

atrial fibrillation and weakness.


Diagnosis: Stroke: No





- Discharge Data


Discharge Date: 06/10/19


Discharge Disposition: DC/Tfer to SNF [facility]


Condition: Fair





- Discharge Diagnosis/Problem(s)


(1) Acute cystitis without hematuria


SNOMED Code(s): 70464004


   ICD Code: N30.00 - ACUTE CYSTITIS WITHOUT HEMATURIA   Status: Acute   

Current Visit: Yes   





(2) Paroxysmal atrial fibrillation with rapid ventricular response


SNOMED Code(s): 356495001, [id number]


   ICD Code: I48.0 - PAROXYSMAL ATRIAL FIBRILLATION   Status: Acute   Current 

Visit: Yes   





(3) Essential hypertension


SNOMED Code(s): 82644776


   ICD Code: I10 - ESSENTIAL (PRIMARY) HYPERTENSION   Status: Chronic   Current 

Visit: No   





- Patient Summary/Data


Consults: 


 Consultations





06/06/19 02:16


PT Evaluation and Treatment [CONS] Routine 


   Please Evaluate and Treat.


   PT Reason for Consult: Ambulation


   This query below is only for informational purposes and is not editable.











Hospital Course: 





Leny presented to the emergency room with generalized weakness, fever and 

urinary frequency. Workup in the emergency room was concerning for a possible 

urinary tract infection but unfortunately a urine culture was not set up at the 

time of admission. This was set up the morning after admission. She was started 

on antibiotics to cover a urinary tract infection and admitted to the hospital. 

Around the time of transfer from the ER to her inpatient bed she developed 

atrial fibrillation with a rapid ventricular response. She received a dose of 

metoprolol with some improvement in her heart rate. The morning after admission 

she remains in atrial fibrillation. Her blood pressure is on the low side of 

normal and her antihypertensive was discontinued. With her low blood pressures 

and somewhat rapid atrial fibrillation with rates in the 120-130 range I 

elected to load her with amiodarone and then provide a continuous infusion over 

the next 24 hours. Patient did convert while on the amiodarone infusion. She 

remained in sinus rhythm for about 24 hours before returning to atrial 

fibrillation. We increased her amiodarone to 200 mg twice daily and her heart 

rate has been controlled since that time. Regarding the urinary tract infection

, her culture has been negative. There is no other obvious source of infection 

identified that she did have a CT scan of the abdomen and pelvis in the 

emergency room which did not show any acute findings. I suspect that she did 

have a urinary tract infection given her symptoms and fever and she has 

completed adequate antibiotic therapy at this time. She is weak because of the 

infection and would benefit from subacute rehabilitation. She'll be discharged 

to the nursing home for restorative therapies. For the atrial fibrillation, she 

will remain on the amiodarone for at least the next 2 weeks. If she is back in 

sinus rhythm we could consider discontinuing the medication at that time. If 

she remains in atrial fibrillation she will need to have anticoagulation 

considered on an outpatient basis. At this time her blood pressure remains on 

the low side of normal and we are unable to be utilized other medications for 

rate control. I did discontinue her antihypertensive and her blood pressures 

have remained low despite this.





- Patient Instructions


Diet: Regular Diet as Tolerated


Activity: As Tolerated


Showering/Bathing: May Shower


Notify Provider of: Fever, Increased Pain, Nausea and/or Vomiting


Other/Special Instructions: 1. You were in the hospital for management of 

presumed urinary tract infection though your culture was negative. You have 

completed adequate antibiotic therapy at this time. During your emergency room 

visit you developed atrial fibrillation with a rapid ventricular response. You 

are still in atrial fibrillation. Your rate is controlled with amiodarone. My 

hope is that your heart will return to a normal rhythm over the next several 

days to couple weeks. You should take amiodarone twice daily for at least the 

next 2 weeks and your heart rhythm can be reassessed at that time.  2. Referral 

to physical and occupational therapy for strengthening in the setting of 

generalized weakness following an acute infection.  3. Code status - DNR/DNI.  

4. Follow up in 10 days.  5. Seek medical attention if you have fever greater 

than 101, persistent vomiting, severe diarrhea or severe shortness of breath.





- Discharge Plan


*PRESCRIPTION DRUG MONITORING PROGRAM REVIEWED*: Not Applicable


*COPY OF PRESCRIPTION DRUG MONITORING REPORT IN PATIENT BK: Not Applicable


Prescriptions/Med Rec: 


Amiodarone [Cordarone] 200 mg PO BID #20 tablet


Home Medications: 


 Home Meds





Acetaminophen [Tylenol] 650 mg PO Q4H PRN  tablet 02/13/19 [Rx]


Fluorouracil 1 applic TOP ASDIRECTED 06/05/19 [History]


Amiodarone [Cordarone] 200 mg PO BID #20 tablet 06/09/19 [Rx]








Oxygen Therapy Mode: Room Air


Patient Handouts:  Atrial Fibrillation, Easy-to-Read, Amiodarone tablets


Referrals: 


Dilia Madrid PA-C [Primary Care Provider] -  (10 days - F/U hospital 

stay for presumed UTI and afib, consider stopping amiodarone if back in sinus 

rhythm )





- Discharge Summary/Plan Comment


DC Time >30 min.: Yes (40 - new nursing home discharge)





- Patient Data


Vitals - Most Recent: 


 Last Vital Signs











Temp  36.3 C   06/09/19 11:00


 


Pulse  91   06/09/19 11:00


 


Resp  16   06/09/19 11:00


 


BP  121/78   06/09/19 11:00


 


Pulse Ox  98   06/09/19 11:00











Weight - Most Recent: 69.309 kg


I&O - Last 24 hours: 


 Intake & Output











 06/08/19 06/09/19 06/09/19





 22:59 06:59 14:59


 


Intake Total 750 290 360


 


Output Total 350 625 


 


Balance 400 -335 360











LON Results - Last 24 hrs: 


 Microbiology











 06/05/19 22:00 Aerobic Blood Culture - Preliminary





 Blood - Venous    NO GROWTH AFTER 3 DAYS





 Anaerobic Blood Culture - Preliminary





    NO GROWTH AFTER 3 DAYS


 


 06/05/19 22:10 Aerobic Blood Culture - Preliminary





 Blood - Venous - Lab Draw    NO GROWTH AFTER 3 DAYS





 Anaerobic Blood Culture - Preliminary





    NO GROWTH AFTER 3 DAYS











Med Orders - Current: 


 Current Medications





Acetaminophen (Tylenol)  650 mg PO Q4H PRN


   PRN Reason: Pain


   Last Admin: 06/09/19 07:45 Dose:  650 mg


Albuterol (Proventil Neb Soln)  2.5 mg NEB Q4H PRN


   PRN Reason: Shortness Of Breath/wheezing


Amiodarone HCl (Cordarone)  200 mg PO BID [facility]


   Last Admin: 06/09/19 10:21 Dose:  200 mg


Bisacodyl (Dulcolax)  5 mg PO DAILY PRN


   PRN Reason: Constipation


Cefdinir (Omnicef)  300 mg PO BID [facility]


   Last Admin: 06/09/19 10:20 Dose:  300 mg


Docusate Sodium (Colace)  100 mg PO BID [facility]


   PRN Reason: Constipation


   Last Admin: 06/09/19 07:45 Dose:  100 mg


Ondansetron HCl (Zofran Odt)  4 mg PO Q6H PRN


   PRN Reason: Nausea able to take PO


Ondansetron HCl (Zofran)  4 mg IV Q4H PRN


   PRN Reason: Nausea/Vomiting


Pantoprazole Sodium (Protonix***)  40 mg PO ACBREAKFAST [facility]


   Last Admin: 06/09/19 07:45 Dose:  40 mg





Discontinued Medications





Amiodarone HCl (Cordarone)  200 mg PO DAILY [facility]


   Last Admin: 06/08/19 08:20 Dose:  200 mg


Sodium Chloride (Normal Saline)  1,000 mls @ 500 mls/hr IV ASDIRECTED [facility]


   Last Admin: 06/05/19 22:28 Dose:  500 mls/hr


Sodium Chloride (Normal Saline)  70 mls @ 3 mls/sec IV ASDIRECTED [facility]


   Stop: 06/05/19 23:20


   Last Admin: 06/05/19 23:37 Dose:  3 mls/sec


Ceftriaxone Sodium 1 gm/ (Sodium Chloride)  50 mls @ 100 mls/hr IV Q24H [facility]


   Last Admin: 06/08/19 02:43 Dose:  100 mls/hr


Sodium Chloride (Normal Saline)  1,000 mls @ 100 mls/hr IV ASDIRECTED [facility]


   Last Admin: 06/06/19 10:20 Dose:  100 mls/hr


Amiodarone HCl 450 mg/ (Dextrose/Water)  250 mls @ 33.33 mls/hr IV ASDIRECTED 

[facility]; Protocol


   Last Admin: 06/06/19 11:25 Dose:  1 mg/min, 33.33 mls/hr


Amiodarone HCl/Dextrose 150 mg (/ Premix)  100 mls @ 400 mls/hr IV ONETIME ONE


   Stop: 06/06/19 11:14


   Last Admin: 06/06/19 11:07 Dose:  400 mls/hr


Sodium Chloride (Normal Saline)  1,000 mls @ 25 mls/hr IV ASDIRECTED [facility]


Magnesium Sulfate 2 gm/ Premix  50 mls @ 12.5 mls/hr IV ONETIME ONE


   Stop: 06/06/19 16:59


   Last Admin: 06/06/19 12:59 Dose:  12.5 mls/hr


Amiodarone HCl/Dextrose 150 mg (/ Premix)  100 mls @ 400 mls/hr IV NOW ONE


   Stop: 06/08/19 10:29


   Last Admin: 06/08/19 10:10 Dose:  400 mls/hr


Iopamidol (Isovue-300 (61%))  100 ml IV .AS DIRECTED [facility]


   Stop: 06/05/19 23:20


   Last Admin: 06/05/19 23:37 Dose:  100 ml


Metoprolol Tartrate (Lopressor)  25 mg PO ONETIME ONE


   Stop: 06/06/19 01:41


   Last Admin: 06/06/19 02:43 Dose:  25 mg


Triamterene/HCTZ (Maxzide 25-37.5 Mg)  0.5 each PO DAILY [facility]


   Last Admin: 06/06/19 08:49 Dose:  0.5 each

## 2019-06-10 VITALS — DIASTOLIC BLOOD PRESSURE: 63 MMHG | SYSTOLIC BLOOD PRESSURE: 126 MMHG
